# Patient Record
Sex: MALE | Race: WHITE | NOT HISPANIC OR LATINO | URBAN - METROPOLITAN AREA
[De-identification: names, ages, dates, MRNs, and addresses within clinical notes are randomized per-mention and may not be internally consistent; named-entity substitution may affect disease eponyms.]

---

## 2018-03-20 ENCOUNTER — INPATIENT (INPATIENT)
Facility: HOSPITAL | Age: 83
LOS: 8 days | Discharge: HOME | DRG: 180 | End: 2018-03-29
Attending: INTERNAL MEDICINE | Admitting: INTERNAL MEDICINE
Payer: MEDICARE

## 2018-03-20 VITALS
RESPIRATION RATE: 14 BRPM | HEIGHT: 67 IN | DIASTOLIC BLOOD PRESSURE: 52 MMHG | WEIGHT: 175.05 LBS | SYSTOLIC BLOOD PRESSURE: 104 MMHG | HEART RATE: 118 BPM | TEMPERATURE: 98 F

## 2018-03-20 DIAGNOSIS — J18.9 PNEUMONIA, UNSPECIFIED ORGANISM: ICD-10-CM

## 2018-03-20 LAB
ALBUMIN SERPL ELPH-MCNC: 2.2 G/DL — LOW (ref 3.5–5)
ALP SERPL-CCNC: 133 U/L — HIGH (ref 40–120)
ALT FLD-CCNC: 33 U/L DA — SIGNIFICANT CHANGE UP (ref 10–60)
ANION GAP SERPL CALC-SCNC: 12 MMOL/L — SIGNIFICANT CHANGE UP (ref 5–17)
AST SERPL-CCNC: 98 U/L — HIGH (ref 10–40)
BASE EXCESS BLDV CALC-SCNC: 4.8 MMOL/L — HIGH (ref -2–2)
BASOPHILS # BLD AUTO: 0.1 K/UL — SIGNIFICANT CHANGE UP (ref 0–0.2)
BASOPHILS NFR BLD AUTO: 0.5 % — SIGNIFICANT CHANGE UP (ref 0–2)
BILIRUB SERPL-MCNC: 0.9 MG/DL — SIGNIFICANT CHANGE UP (ref 0.2–1.2)
BLOOD GAS COMMENTS, VENOUS: SIGNIFICANT CHANGE UP
BUN SERPL-MCNC: 30 MG/DL — HIGH (ref 7–18)
CALCIUM SERPL-MCNC: 12.4 MG/DL — HIGH (ref 8.4–10.5)
CHLORIDE SERPL-SCNC: 104 MMOL/L — SIGNIFICANT CHANGE UP (ref 96–108)
CK SERPL-CCNC: 1476 U/L — HIGH (ref 35–232)
CO2 SERPL-SCNC: 24 MMOL/L — SIGNIFICANT CHANGE UP (ref 22–31)
CREAT SERPL-MCNC: 1.4 MG/DL — HIGH (ref 0.5–1.3)
EOSINOPHIL # BLD AUTO: 0 K/UL — SIGNIFICANT CHANGE UP (ref 0–0.5)
EOSINOPHIL NFR BLD AUTO: 0 % — SIGNIFICANT CHANGE UP (ref 0–6)
GLUCOSE SERPL-MCNC: 135 MG/DL — HIGH (ref 70–99)
HCO3 BLDV-SCNC: 28 MMOL/L — SIGNIFICANT CHANGE UP (ref 21–29)
HCT VFR BLD CALC: 33.1 % — LOW (ref 39–50)
HGB BLD-MCNC: 9.8 G/DL — LOW (ref 13–17)
HOROWITZ INDEX BLDV+IHG-RTO: 21 — SIGNIFICANT CHANGE UP
LACTATE SERPL-SCNC: 3.4 MMOL/L — HIGH (ref 0.7–2)
LYMPHOCYTES # BLD AUTO: 0.5 K/UL — LOW (ref 1–3.3)
LYMPHOCYTES # BLD AUTO: 3.2 % — LOW (ref 13–44)
MCHC RBC-ENTMCNC: 26.3 PG — LOW (ref 27–34)
MCHC RBC-ENTMCNC: 29.6 GM/DL — LOW (ref 32–36)
MCV RBC AUTO: 88.9 FL — SIGNIFICANT CHANGE UP (ref 80–100)
MONOCYTES # BLD AUTO: 0.7 K/UL — SIGNIFICANT CHANGE UP (ref 0–0.9)
MONOCYTES NFR BLD AUTO: 4.4 % — SIGNIFICANT CHANGE UP (ref 2–14)
NEUTROPHILS # BLD AUTO: 15 K/UL — HIGH (ref 1.8–7.4)
NEUTROPHILS NFR BLD AUTO: 91.9 % — HIGH (ref 43–77)
NT-PROBNP SERPL-SCNC: HIGH PG/ML (ref 0–450)
PCO2 BLDV: 40 MMHG — SIGNIFICANT CHANGE UP (ref 35–50)
PH BLDV: 7.47 — HIGH (ref 7.35–7.45)
PLATELET # BLD AUTO: 201 K/UL — SIGNIFICANT CHANGE UP (ref 150–400)
PO2 BLDV: <47 MMHG — SIGNIFICANT CHANGE UP (ref 25–45)
POTASSIUM SERPL-MCNC: 4.3 MMOL/L — SIGNIFICANT CHANGE UP (ref 3.5–5.3)
POTASSIUM SERPL-SCNC: 4.3 MMOL/L — SIGNIFICANT CHANGE UP (ref 3.5–5.3)
PROT SERPL-MCNC: 7.8 G/DL — SIGNIFICANT CHANGE UP (ref 6–8.3)
RBC # BLD: 3.72 M/UL — LOW (ref 4.2–5.8)
RBC # FLD: 15.8 % — HIGH (ref 10.3–14.5)
SAO2 % BLDV: 64 % — LOW (ref 67–88)
SODIUM SERPL-SCNC: 140 MMOL/L — SIGNIFICANT CHANGE UP (ref 135–145)
TROPONIN I SERPL-MCNC: 0.1 NG/ML — HIGH (ref 0–0.04)
WBC # BLD: 16.3 K/UL — HIGH (ref 3.8–10.5)
WBC # FLD AUTO: 16.3 K/UL — HIGH (ref 3.8–10.5)

## 2018-03-20 PROCEDURE — 71046 X-RAY EXAM CHEST 2 VIEWS: CPT | Mod: 26

## 2018-03-20 PROCEDURE — 72170 X-RAY EXAM OF PELVIS: CPT | Mod: 26

## 2018-03-20 PROCEDURE — 99285 EMERGENCY DEPT VISIT HI MDM: CPT

## 2018-03-20 RX ORDER — SODIUM CHLORIDE 9 MG/ML
1000 INJECTION INTRAMUSCULAR; INTRAVENOUS; SUBCUTANEOUS ONCE
Qty: 0 | Refills: 0 | Status: COMPLETED | OUTPATIENT
Start: 2018-03-20 | End: 2018-03-20

## 2018-03-20 RX ORDER — CEFTRIAXONE 500 MG/1
1 INJECTION, POWDER, FOR SOLUTION INTRAMUSCULAR; INTRAVENOUS ONCE
Qty: 0 | Refills: 0 | Status: COMPLETED | OUTPATIENT
Start: 2018-03-20 | End: 2018-03-20

## 2018-03-20 RX ORDER — AZITHROMYCIN 500 MG/1
500 TABLET, FILM COATED ORAL ONCE
Qty: 0 | Refills: 0 | Status: COMPLETED | OUTPATIENT
Start: 2018-03-20 | End: 2018-03-20

## 2018-03-20 RX ADMIN — SODIUM CHLORIDE 4000 MILLILITER(S): 9 INJECTION INTRAMUSCULAR; INTRAVENOUS; SUBCUTANEOUS at 22:40

## 2018-03-20 RX ADMIN — SODIUM CHLORIDE 4000 MILLILITER(S): 9 INJECTION INTRAMUSCULAR; INTRAVENOUS; SUBCUTANEOUS at 22:25

## 2018-03-20 RX ADMIN — AZITHROMYCIN 250 MILLIGRAM(S): 500 TABLET, FILM COATED ORAL at 22:40

## 2018-03-20 RX ADMIN — CEFTRIAXONE 100 GRAM(S): 500 INJECTION, POWDER, FOR SOLUTION INTRAMUSCULAR; INTRAVENOUS at 22:39

## 2018-03-20 NOTE — ED PROVIDER NOTE - MEDICAL DECISION MAKING DETAILS
admit for hydration, iv abx, and futher mgmt  rvp pending. pan cultured. ecg shows tachycardia to 116, no stemi

## 2018-03-20 NOTE — ED PROVIDER NOTE - CONDUCTED A DETAILED DISCUSSION WITH PATIENT AND/OR GUARDIAN REGARDING, MDM
lab results/need for outpatient follow-up/radiology results need to admit/lab results/radiology results

## 2018-03-20 NOTE — ED PROVIDER NOTE - PROGRESS NOTE DETAILS
xr suspicious for pna. treated with cftx/azithro  iv hydration. trop of 0.1, likely demand ischemia. dehydrated, cpk in 1400, but bnp 19k, judicious hydration.

## 2018-03-20 NOTE — ED ADULT TRIAGE NOTE - CHIEF COMPLAINT QUOTE
BIBA with c/o syncopal episode, as per EMS patient found on the floor . Patient lives alone.  [facility] # [phone] ext. [phone], and [facility] # [phone]

## 2018-03-20 NOTE — ED PROVIDER NOTE - OBJECTIVE STATEMENT
86 y/o M pt with PMHx of Anemia (on Iron) and no significant PSHx BIBA to ED with syncopal episode today. EMS reports pt was found on the floor of his appointment. In ED, pt states he slipped and fell on his mattress yesterday (last night), and was unable to get up until he was assisted by "policemen and a nurse". Pt notes recent dyspnea on exertion and abdominal pain as well. Pt denies any other complaints. NKDA. 88 y/o M pt with PMHx of Anemia (on Iron) and no significant PSHx BIBA to ED with syncopal episode today. EMS reports pt was found on the floor of his appointment. In ED, pt states he slipped and fell on his mattress yesterday (last night), and was unable to get up until he was assisted by "policemen and a nurse". Pt notes recent dyspnea on exertion and abdominal pain as well. Pt denies any other complaints. NKDA.  HCP niece who lives in [phone], [phone]

## 2018-03-20 NOTE — ED PROVIDER NOTE - CARE PLAN
Principal Discharge DX:	Pneumonia  Secondary Diagnosis:	Dehydration  Secondary Diagnosis:	Fall, initial encounter
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
## 2018-03-21 DIAGNOSIS — D72.829 ELEVATED WHITE BLOOD CELL COUNT, UNSPECIFIED: ICD-10-CM

## 2018-03-21 DIAGNOSIS — Z29.9 ENCOUNTER FOR PROPHYLACTIC MEASURES, UNSPECIFIED: ICD-10-CM

## 2018-03-21 DIAGNOSIS — D64.9 ANEMIA, UNSPECIFIED: ICD-10-CM

## 2018-03-21 DIAGNOSIS — N17.9 ACUTE KIDNEY FAILURE, UNSPECIFIED: ICD-10-CM

## 2018-03-21 DIAGNOSIS — M62.82 RHABDOMYOLYSIS: ICD-10-CM

## 2018-03-21 DIAGNOSIS — E83.52 HYPERCALCEMIA: ICD-10-CM

## 2018-03-21 LAB
24R-OH-CALCIDIOL SERPL-MCNC: 33 NG/ML — SIGNIFICANT CHANGE UP (ref 30–80)
ALBUMIN SERPL ELPH-MCNC: 1.9 G/DL — LOW (ref 3.5–5)
ALBUMIN SERPL ELPH-MCNC: 2 G/DL — LOW (ref 3.5–5)
ALP SERPL-CCNC: 114 U/L — SIGNIFICANT CHANGE UP (ref 40–120)
ALP SERPL-CCNC: 119 U/L — SIGNIFICANT CHANGE UP (ref 40–120)
ALT FLD-CCNC: 28 U/L DA — SIGNIFICANT CHANGE UP (ref 10–60)
ALT FLD-CCNC: 29 U/L DA — SIGNIFICANT CHANGE UP (ref 10–60)
ANION GAP SERPL CALC-SCNC: 9 MMOL/L — SIGNIFICANT CHANGE UP (ref 5–17)
ANION GAP SERPL CALC-SCNC: 9 MMOL/L — SIGNIFICANT CHANGE UP (ref 5–17)
APPEARANCE UR: CLEAR — SIGNIFICANT CHANGE UP
AST SERPL-CCNC: 68 U/L — HIGH (ref 10–40)
AST SERPL-CCNC: 78 U/L — HIGH (ref 10–40)
BASOPHILS # BLD AUTO: 0 K/UL — SIGNIFICANT CHANGE UP (ref 0–0.2)
BASOPHILS NFR BLD AUTO: 0.3 % — SIGNIFICANT CHANGE UP (ref 0–2)
BILIRUB SERPL-MCNC: 0.5 MG/DL — SIGNIFICANT CHANGE UP (ref 0.2–1.2)
BILIRUB SERPL-MCNC: 0.5 MG/DL — SIGNIFICANT CHANGE UP (ref 0.2–1.2)
BILIRUB UR-MCNC: NEGATIVE — SIGNIFICANT CHANGE UP
BUN SERPL-MCNC: 31 MG/DL — HIGH (ref 7–18)
BUN SERPL-MCNC: 33 MG/DL — HIGH (ref 7–18)
CALCIUM SERPL-MCNC: 11.6 MG/DL — HIGH (ref 8.4–10.5)
CALCIUM SERPL-MCNC: 11.8 MG/DL — HIGH (ref 8.4–10.5)
CALCIUM SERPL-MCNC: 12.6 MG/DL — HIGH (ref 8.4–10.5)
CEA SERPL-MCNC: 4.2 NG/ML — HIGH (ref 0–3.8)
CHLORIDE SERPL-SCNC: 107 MMOL/L — SIGNIFICANT CHANGE UP (ref 96–108)
CHLORIDE SERPL-SCNC: 110 MMOL/L — HIGH (ref 96–108)
CK MB BLD-MCNC: 1 % — SIGNIFICANT CHANGE UP (ref 0–3.5)
CK MB CFR SERPL CALC: 9.2 NG/ML — HIGH (ref 0–3.6)
CK SERPL-CCNC: 471 U/L — HIGH (ref 35–232)
CK SERPL-CCNC: 888 U/L — HIGH (ref 35–232)
CO2 SERPL-SCNC: 25 MMOL/L — SIGNIFICANT CHANGE UP (ref 22–31)
CO2 SERPL-SCNC: 27 MMOL/L — SIGNIFICANT CHANGE UP (ref 22–31)
COLOR SPEC: YELLOW — SIGNIFICANT CHANGE UP
CREAT SERPL-MCNC: 1 MG/DL — SIGNIFICANT CHANGE UP (ref 0.5–1.3)
CREAT SERPL-MCNC: 1.1 MG/DL — SIGNIFICANT CHANGE UP (ref 0.5–1.3)
DIFF PNL FLD: NEGATIVE — SIGNIFICANT CHANGE UP
EOSINOPHIL # BLD AUTO: 0 K/UL — SIGNIFICANT CHANGE UP (ref 0–0.5)
EOSINOPHIL NFR BLD AUTO: 0 % — SIGNIFICANT CHANGE UP (ref 0–6)
ERYTHROCYTE [SEDIMENTATION RATE] IN BLOOD: 112 MM/HR — HIGH (ref 0–20)
FERRITIN SERPL-MCNC: 1016 NG/ML — HIGH (ref 30–400)
FOLATE SERPL-MCNC: 14.9 NG/ML — SIGNIFICANT CHANGE UP (ref 4.8–24.2)
GLUCOSE SERPL-MCNC: 103 MG/DL — HIGH (ref 70–99)
GLUCOSE SERPL-MCNC: 119 MG/DL — HIGH (ref 70–99)
GLUCOSE UR QL: NEGATIVE — SIGNIFICANT CHANGE UP
HCT VFR BLD CALC: 29.1 % — LOW (ref 39–50)
HGB BLD-MCNC: 8.7 G/DL — LOW (ref 13–17)
KETONES UR-MCNC: NEGATIVE — SIGNIFICANT CHANGE UP
LACTATE SERPL-SCNC: 1.9 MMOL/L — SIGNIFICANT CHANGE UP (ref 0.7–2)
LACTATE SERPL-SCNC: 2.8 MMOL/L — HIGH (ref 0.7–2)
LDH SERPL L TO P-CCNC: 373 U/L — HIGH (ref 120–225)
LEUKOCYTE ESTERASE UR-ACNC: NEGATIVE — SIGNIFICANT CHANGE UP
LYMPHOCYTES # BLD AUTO: 0.6 K/UL — LOW (ref 1–3.3)
LYMPHOCYTES # BLD AUTO: 4.4 % — LOW (ref 13–44)
MCHC RBC-ENTMCNC: 26.6 PG — LOW (ref 27–34)
MCHC RBC-ENTMCNC: 29.8 GM/DL — LOW (ref 32–36)
MCV RBC AUTO: 89.4 FL — SIGNIFICANT CHANGE UP (ref 80–100)
MONOCYTES # BLD AUTO: 0.5 K/UL — SIGNIFICANT CHANGE UP (ref 0–0.9)
MONOCYTES NFR BLD AUTO: 4.1 % — SIGNIFICANT CHANGE UP (ref 2–14)
NEUTROPHILS # BLD AUTO: 11.8 K/UL — HIGH (ref 1.8–7.4)
NEUTROPHILS NFR BLD AUTO: 91.2 % — HIGH (ref 43–77)
NITRITE UR-MCNC: NEGATIVE — SIGNIFICANT CHANGE UP
PH UR: 5 — SIGNIFICANT CHANGE UP (ref 5–8)
PLATELET # BLD AUTO: 176 K/UL — SIGNIFICANT CHANGE UP (ref 150–400)
POTASSIUM SERPL-MCNC: 3.4 MMOL/L — LOW (ref 3.5–5.3)
POTASSIUM SERPL-MCNC: 4.1 MMOL/L — SIGNIFICANT CHANGE UP (ref 3.5–5.3)
POTASSIUM SERPL-SCNC: 3.4 MMOL/L — LOW (ref 3.5–5.3)
POTASSIUM SERPL-SCNC: 4.1 MMOL/L — SIGNIFICANT CHANGE UP (ref 3.5–5.3)
PROCALCITONIN SERPL-MCNC: 1.07 NG/ML — HIGH (ref 0–0.04)
PROT SERPL-MCNC: 6.7 G/DL — SIGNIFICANT CHANGE UP (ref 6–8.3)
PROT SERPL-MCNC: 7.3 G/DL — SIGNIFICANT CHANGE UP (ref 6–8.3)
PROT UR-MCNC: 30 MG/DL
PTH-INTACT FLD-MCNC: 10 PG/ML — LOW (ref 15–65)
RAPID RVP RESULT: SIGNIFICANT CHANGE UP
RBC # BLD: 3.07 M/UL — LOW (ref 4.2–5.8)
RBC # BLD: 3.26 M/UL — LOW (ref 4.2–5.8)
RBC # FLD: 15.8 % — HIGH (ref 10.3–14.5)
RETICS #: 61.7 K/UL — SIGNIFICANT CHANGE UP (ref 25–125)
RETICS/RBC NFR: 2 % — SIGNIFICANT CHANGE UP (ref 0.5–2.5)
SODIUM SERPL-SCNC: 143 MMOL/L — SIGNIFICANT CHANGE UP (ref 135–145)
SODIUM SERPL-SCNC: 144 MMOL/L — SIGNIFICANT CHANGE UP (ref 135–145)
SP GR SPEC: 1.02 — SIGNIFICANT CHANGE UP (ref 1.01–1.02)
TROPONIN I SERPL-MCNC: 0.06 NG/ML — HIGH (ref 0–0.04)
UROBILINOGEN FLD QL: NEGATIVE — SIGNIFICANT CHANGE UP
VIT B12 SERPL-MCNC: 333 PG/ML — SIGNIFICANT CHANGE UP (ref 232–1245)
WBC # BLD: 12.9 K/UL — HIGH (ref 3.8–10.5)
WBC # FLD AUTO: 12.9 K/UL — HIGH (ref 3.8–10.5)

## 2018-03-21 PROCEDURE — 93880 EXTRACRANIAL BILAT STUDY: CPT | Mod: 26

## 2018-03-21 PROCEDURE — 70450 CT HEAD/BRAIN W/O DYE: CPT | Mod: 26

## 2018-03-21 RX ORDER — METOPROLOL TARTRATE 50 MG
25 TABLET ORAL
Qty: 0 | Refills: 0 | Status: DISCONTINUED | OUTPATIENT
Start: 2018-03-21 | End: 2018-03-29

## 2018-03-21 RX ORDER — ALLOPURINOL 300 MG
300 TABLET ORAL DAILY
Qty: 0 | Refills: 0 | Status: DISCONTINUED | OUTPATIENT
Start: 2018-03-21 | End: 2018-03-28

## 2018-03-21 RX ORDER — SODIUM CHLORIDE 9 MG/ML
1000 INJECTION INTRAMUSCULAR; INTRAVENOUS; SUBCUTANEOUS
Qty: 0 | Refills: 0 | Status: DISCONTINUED | OUTPATIENT
Start: 2018-03-21 | End: 2018-03-23

## 2018-03-21 RX ORDER — HEPARIN SODIUM 5000 [USP'U]/ML
5000 INJECTION INTRAVENOUS; SUBCUTANEOUS EVERY 8 HOURS
Qty: 0 | Refills: 0 | Status: DISCONTINUED | OUTPATIENT
Start: 2018-03-21 | End: 2018-03-28

## 2018-03-21 RX ORDER — METOPROLOL TARTRATE 50 MG
5 TABLET ORAL ONCE
Qty: 0 | Refills: 0 | Status: COMPLETED | OUTPATIENT
Start: 2018-03-21 | End: 2018-03-21

## 2018-03-21 RX ORDER — AMLODIPINE BESYLATE 2.5 MG/1
5 TABLET ORAL DAILY
Qty: 0 | Refills: 0 | Status: DISCONTINUED | OUTPATIENT
Start: 2018-03-21 | End: 2018-03-28

## 2018-03-21 RX ORDER — POTASSIUM CHLORIDE 20 MEQ
40 PACKET (EA) ORAL ONCE
Qty: 0 | Refills: 0 | Status: COMPLETED | OUTPATIENT
Start: 2018-03-21 | End: 2018-03-22

## 2018-03-21 RX ORDER — FERROUS SULFATE 325(65) MG
325 TABLET ORAL DAILY
Qty: 0 | Refills: 0 | Status: DISCONTINUED | OUTPATIENT
Start: 2018-03-21 | End: 2018-03-27

## 2018-03-21 RX ADMIN — Medication 5 MILLIGRAM(S): at 18:58

## 2018-03-21 RX ADMIN — Medication 325 MILLIGRAM(S): at 12:08

## 2018-03-21 RX ADMIN — SODIUM CHLORIDE 80 MILLILITER(S): 9 INJECTION INTRAMUSCULAR; INTRAVENOUS; SUBCUTANEOUS at 22:18

## 2018-03-21 RX ADMIN — Medication 300 MILLIGRAM(S): at 12:08

## 2018-03-21 RX ADMIN — AMLODIPINE BESYLATE 5 MILLIGRAM(S): 2.5 TABLET ORAL at 06:30

## 2018-03-21 RX ADMIN — HEPARIN SODIUM 5000 UNIT(S): 5000 INJECTION INTRAVENOUS; SUBCUTANEOUS at 22:17

## 2018-03-21 RX ADMIN — HEPARIN SODIUM 5000 UNIT(S): 5000 INJECTION INTRAVENOUS; SUBCUTANEOUS at 17:14

## 2018-03-21 RX ADMIN — Medication 1 TABLET(S): at 12:08

## 2018-03-21 RX ADMIN — Medication 25 MILLIGRAM(S): at 22:17

## 2018-03-21 NOTE — ED ADULT NURSE NOTE - CHIEF COMPLAINT QUOTE
BIBA with c/o syncopal episode, as per EMS patient found on the floor . Patient lives alone.  [facility] # [phone] ext. [phone], and [facility] # [phone]

## 2018-03-21 NOTE — H&P ADULT - PROBLEM SELECTOR PLAN 7
IMPROVE VTE Individual Risk Assessment          RISK                                                          Points   [ x ] Immobilization > 24 hrs                              1  [ x ] Age > 60                                                         1    IMPROVE VTE Score: 2    c/w lovenox for VTE prophylaxis c/w iron supplementation c/w iron supplementation  may need further evaluation for multiple myeloma

## 2018-03-21 NOTE — ED ADULT NURSE REASSESSMENT NOTE - NS ED NURSE REASSESS COMMENT FT1
Received pt from AILEEN Fernandez, pt is observed laying in bed, breathing room air, in no respiratory distress. Pt is A&O x3, skin is intact, bruises to right elbow noted. On tele box D, normal sinus rhythm noted. AM Meds administered as ordered nursing monitoring continues.
Patient start on oral gastro view not tolerating well refusing to drink any more drank only amy Greco Np made aware.

## 2018-03-21 NOTE — H&P ADULT - ASSESSMENT
88 y/o M pt with PMHx of Anemia (on Iron), HTN, gout and no significant PSHx BIBA to ED with syncopal episode today. EMS reports pt was found on the floor of his apartment. In ED, pt states he slipped and fell on his mattress on the night of 3/19, and was unable to get up until he was assisted by "policemen and a nurse" on 3/20. Pt notes recent dyspnea on exertion, and generalized pain everywhere after the fall. Pt denies LOC, head trauma.     Pt admitted for evaluation of possible syncope given unwitnessed fall along with rhabdomyolysis.

## 2018-03-21 NOTE — H&P ADULT - PROBLEM SELECTOR PLAN 4
improved on admission 2/2 dehydration  given IV fluids in ED  continue to monitor BMP for creatinine CK improved from 14k to 800  c/w IV fluid hydration CK improved from 14k to 800  c/w IV fluid hydration  elevated lactate, trending down from 3.2 to 2.8  f/u lactate at 12PM

## 2018-03-21 NOTE — CONSULT NOTE ADULT - SUBJECTIVE AND OBJECTIVE BOX
87 year old male with h/o anemia for a few years, DM, HTN slipped and fell at home and not able to get up.  no LOC, fever, chills, sob, CP, headache.  he does have SOBE and pain everywhere after fall.    alert, not in distress. no palpable nodes at neck. chest is clear. abd is soft and flat. no mass. no leg edema.                        9.8    16.3  )-----------( 201      ( 20 Mar 2018 21:17 )             33.1   03-21    144  |  110<H>  |  33<H>  ----------------------------<  119<H>  4.1   |  25  |  1.10    Ca    11.8<H>      21 Mar 2018 04:20    TPro  6.7  /  Alb  1.9<L>< from: Xray Chest 2 Views PA/Lat (03.20.18 @ 20:48) >  INTERPRETATION:  PA and lateral chest on March 20, 2018 at 8:17 PM.   Patient had a syncopal episode and has abnormal breath sounds.    Heart size is within normal limits. A large hiatal hernia is seen on the   lateral view.    On June 21, 2012 the lungs were under circulated but otherwise   unremarkable.    Present film shows small scattered amorphous densities in the lungs left   greater than right and aleft base atelectatic type process.    A CAT scan of the chest is probably appropriate to fully assess the lung   fields.    IMPRESSION: Scattered amorphous lung densities and a sizable hiatal   hernia. Consider CT chest for further assessment.    < end of copied text >  < from: Xray Pelvis AP only (03.20.18 @ 22:40) >  INTERPRETATION:  Pelvis. Patient had a fall and has local pain.    The hips are relatively free of degeneration and are symmetric.    There is lower lumbar spondylitic change.    No bone destruction or fracture is evident.    There are arterial calcifications.    The visualized colon is mildly distended with gas and fecal content but   does not overtly suggest obstruction.    IMPRESSION: As above.      < end of copied text >    /  TBili  0.5  /  DBili  x   /  AST  78<H>  /  ALT  29  /  AlkPhos  114  03-21

## 2018-03-21 NOTE — CHART NOTE - NSCHARTNOTEFT_GEN_A_CORE
Paged by RN for tachycardia - on telemetry Afib w/ RVR 130s, no known Hx as per HPI  - Giving 5 IV Lopressor + PO 25 BID Paged by RN for tachycardia - on telemetry concerning for Afib w/ RVR 130s, no known Hx as per HPI, patient in NAD. No CP, SOB, lightheadedness, or abdominal complaints  - EKG shows definite P waves; irregular rhythm; possible atrial tachycardia w/ rate dependent block  - No Hx of Lung disease  - Giving 5 IV Lopressor; resolved to NSR  - C/w PO Lopressor BID  Primary team to follow up

## 2018-03-21 NOTE — ED ADULT NURSE NOTE - OBJECTIVE STATEMENT
brought in by EMS due to syncopal episode found on floor. Pt Alert verbally responsive, oriented to name and place. Seen and examined by Dr Licea. brought in by EMS due to syncopal episode found on floor. Seen and examined by Dr Licea. brought in by EMS due to syncopal episode found on floor. Seen and examined by Dr Licea. Pt states he slipped and fell noted with abrasion to right elbow with minimal redness and swelling.

## 2018-03-21 NOTE — H&P ADULT - PROBLEM SELECTOR PLAN 5
PT consult improved on admission 2/2 dehydration  given IV fluids in ED  continue to monitor BMP for creatinine improved on admission 2/2 dehydration  given IV fluids in ED  continue to monitor BMP for creatinine at 12PM

## 2018-03-21 NOTE — H&P ADULT - PROBLEM SELECTOR PLAN 2
f/u CXR reading  CT head shows possible sinusitis  s/p 1 dose of ceftriaxone, azithromycin in ED  c/w amoxicillin/clavulanate f/u CXR reading, possible CT chest  CT head shows possible sinusitis  s/p 1 dose of ceftriaxone, azithromycin in ED  c/w amoxicillin/clavulanate f/u CXR reading, CT chest  CT head shows possible sinusitis  s/p 1 dose of ceftriaxone, azithromycin in ED  c/w amoxicillin/clavulanate for concerns of sinusitis for now, can change abx depending on CT findings  f/u CBC at 12PM, procalcitonin, urine culture

## 2018-03-21 NOTE — H&P ADULT - PROBLEM SELECTOR PLAN 1
continue with telemetry monitoring for 24 hrs  f/u echocardiogram  f/u carotid dopplers  troponin x2 negative

## 2018-03-21 NOTE — H&P ADULT - PROBLEM SELECTOR PLAN 3
CK improved from 14k to 800  c/w IV fluid hydration elevated calcium in setting of NINFA  continue with IVF  f/u PTH, Vit D, elevated calcium in setting of NINFA, anemia, may  need evaluation for possible multiple myeloma  continue with IVF  f/u PTH, Vit D at 12PM  f/u heme/onc - Dr. Bailey elevated calcium in setting of NINFA, anemia, may  need evaluation for possible multiple myeloma  continue with IVF  f/u PTH, Vit D at 12PM  f/u heme/onc - Dr. Bailey  f/u CT Abd/pelvis

## 2018-03-21 NOTE — H&P ADULT - HISTORY OF PRESENT ILLNESS
86 y/o M pt with PMHx of Anemia (on Iron) and no significant PSHx BIBA to ED with syncopal episode today. EMS reports pt was found on the floor of his apartmentt. In ED, pt states he slipped and fell on his mattress yesterday (last night), and was unable to get up until he was assisted by "policemen and a nurse". Pt notes recent dyspnea on exertion and abdominal pain as well. Pt denies any other complaints    HCP niece who lives in [phone], [phone] 88 y/o M pt with PMHx of Anemia (on Iron), HTN, gout and no significant PSHx BIBA to ED with syncopal episode today. EMS reports pt was found on the floor of his apartment. In ED, pt states he slipped and fell on his mattress on the night of 3/19, and was unable to get up until he was assisted by "policemen and a nurse" on 3/20. Pt notes recent dyspnea on exertion, and generalized pain everywhere after the fall. Pt denies LOC, head trauma, fever, headache, shortness of breath, chest pain, abdominal pain, dysuria, diarrhea. all other ROS negative.

## 2018-03-21 NOTE — H&P ADULT - NSHPSOCIALHISTORY_GEN_ALL_CORE
pt lives alone; retired from [employer]; states smoking and drinking alcohol many years ago, however quit; denies current tobacco/alcohol/drug use

## 2018-03-21 NOTE — H&P ADULT - NSHPPHYSICALEXAM_GEN_ALL_CORE
Vital Signs Last 24 Hrs  T(C): 36.4 (21 Mar 2018 00:11), Max: 36.8 (20 Mar 2018 19:50)  T(F): 97.6 (21 Mar 2018 00:11), Max: 98.2 (20 Mar 2018 19:50)  HR: 104 (21 Mar 2018 00:11) (104 - 118)  BP: 117/68 (21 Mar 2018 00:11) (104/52 - 117/68)  RR: 16 (21 Mar 2018 00:11) (14 - 16)  SpO2: 94% (21 Mar 2018 00:11) (94% - 94%)

## 2018-03-21 NOTE — H&P ADULT - NSHPLABSRESULTS_GEN_ALL_CORE
LABS:      CBC Full  -  ( 20 Mar 2018 21:17 )  WBC Count : 16.3 K/uL  Hemoglobin : 9.8 g/dL  Hematocrit : 33.1 %  Platelet Count - Automated : 201 K/uL  Mean Cell Volume : 88.9 fl  Mean Cell Hemoglobin : 26.3 pg  Mean Cell Hemoglobin Concentration : 29.6 gm/dL  Auto Neutrophil # : 15.0 K/uL  Auto Lymphocyte # : 0.5 K/uL  Auto Monocyte # : 0.7 K/uL  Auto Eosinophil # : 0.0 K/uL  Auto Basophil # : 0.1 K/uL  Auto Neutrophil % : 91.9 %  Auto Lymphocyte % : 3.2 %  Auto Monocyte % : 4.4 %  Auto Eosinophil % : 0.0 %  Auto Basophil % : 0.5 %    03    144  |  110<H>  |  33<H>  ----------------------------<  119<H>  4.1   |  25  |  1.10    Ca    11.8<H>      21 Mar 2018 04:20    TPro  6.7  /  Alb  1.9<L>  /  TBili  0.5  /  DBili  x   /  AST  78<H>  /  ALT  29  /  AlkPhos  114  -          Urinalysis Basic - ( 21 Mar 2018 00:35 )    Color: Yellow / Appearance: Clear / S.025 / pH: x  Gluc: x / Ketone: Negative  / Bili: Negative / Urobili: Negative   Blood: x / Protein: 30 mg/dL / Nitrite: Negative   Leuk Esterase: Negative / RBC: 5-10 /HPF / WBC 6-10 /HPF   Sq Epi: x / Non Sq Epi: Few /HPF / Bacteria: Moderate /HPF                RADIOLOGY & ADDITIONAL STUDIES (The following images were personally reviewed):    EKG:     CXR LABS:      CBC Full  -  ( 20 Mar 2018 21:17 )  WBC Count : 16.3 K/uL  Hemoglobin : 9.8 g/dL  Hematocrit : 33.1 %  Platelet Count - Automated : 201 K/uL  Mean Cell Volume : 88.9 fl  Mean Cell Hemoglobin : 26.3 pg  Mean Cell Hemoglobin Concentration : 29.6 gm/dL  Auto Neutrophil # : 15.0 K/uL  Auto Lymphocyte # : 0.5 K/uL  Auto Monocyte # : 0.7 K/uL  Auto Eosinophil # : 0.0 K/uL  Auto Basophil # : 0.1 K/uL  Auto Neutrophil % : 91.9 %  Auto Lymphocyte % : 3.2 %  Auto Monocyte % : 4.4 %  Auto Eosinophil % : 0.0 %  Auto Basophil % : 0.5 %    03    144  |  110<H>  |  33<H>  ----------------------------<  119<H>  4.1   |  25  |  1.10    Ca    11.8<H>      21 Mar 2018 04:20    TPro  6.7  /  Alb  1.9<L>  /  TBili  0.5  /  DBili  x   /  AST  78<H>  /  ALT  29  /  AlkPhos  114  03-          Urinalysis Basic - ( 21 Mar 2018 00:35 )    Color: Yellow / Appearance: Clear / S.025 / pH: x  Gluc: x / Ketone: Negative  / Bili: Negative / Urobili: Negative   Blood: x / Protein: 30 mg/dL / Nitrite: Negative   Leuk Esterase: Negative / RBC: 5-10 /HPF / WBC 6-10 /HPF   Sq Epi: x / Non Sq Epi: Few /HPF / Bacteria: Moderate /HPF                RADIOLOGY & ADDITIONAL STUDIES (The following images were personally reviewed):    EKG:     CXR:

## 2018-03-21 NOTE — H&P ADULT - PROBLEM SELECTOR PLAN 8
IMPROVE VTE Individual Risk Assessment          RISK                                                          Points   [ x ] Immobilization > 24 hrs                              1  [ x ] Age > 60                                                         1    IMPROVE VTE Score: 2    c/w lovenox for VTE prophylaxis

## 2018-03-22 DIAGNOSIS — Z71.89 OTHER SPECIFIED COUNSELING: ICD-10-CM

## 2018-03-22 LAB
ANION GAP SERPL CALC-SCNC: 11 MMOL/L — SIGNIFICANT CHANGE UP (ref 5–17)
BUN SERPL-MCNC: 35 MG/DL — HIGH (ref 7–18)
CALCIUM SERPL-MCNC: 11.6 MG/DL — HIGH (ref 8.4–10.5)
CHLORIDE SERPL-SCNC: 111 MMOL/L — HIGH (ref 96–108)
CK SERPL-CCNC: 241 U/L — HIGH (ref 35–232)
CO2 SERPL-SCNC: 24 MMOL/L — SIGNIFICANT CHANGE UP (ref 22–31)
CREAT SERPL-MCNC: 0.9 MG/DL — SIGNIFICANT CHANGE UP (ref 0.5–1.3)
GLUCOSE BLDC GLUCOMTR-MCNC: 105 MG/DL — HIGH (ref 70–99)
GLUCOSE SERPL-MCNC: 97 MG/DL — SIGNIFICANT CHANGE UP (ref 70–99)
HAPTOGLOB SERPL-MCNC: 241 MG/DL — HIGH (ref 34–200)
HCT VFR BLD CALC: 27.9 % — LOW (ref 39–50)
HGB BLD-MCNC: 8.4 G/DL — LOW (ref 13–17)
INTERPRETATION SERPL IFE-IMP: SIGNIFICANT CHANGE UP
MCHC RBC-ENTMCNC: 26.9 PG — LOW (ref 27–34)
MCHC RBC-ENTMCNC: 30.2 GM/DL — LOW (ref 32–36)
MCV RBC AUTO: 89.1 FL — SIGNIFICANT CHANGE UP (ref 80–100)
PLATELET # BLD AUTO: 173 K/UL — SIGNIFICANT CHANGE UP (ref 150–400)
POTASSIUM SERPL-MCNC: 3.8 MMOL/L — SIGNIFICANT CHANGE UP (ref 3.5–5.3)
POTASSIUM SERPL-SCNC: 3.8 MMOL/L — SIGNIFICANT CHANGE UP (ref 3.5–5.3)
RBC # BLD: 3.14 M/UL — LOW (ref 4.2–5.8)
RBC # FLD: 15.9 % — HIGH (ref 10.3–14.5)
SODIUM SERPL-SCNC: 146 MMOL/L — HIGH (ref 135–145)
WBC # BLD: 13.9 K/UL — HIGH (ref 3.8–10.5)
WBC # FLD AUTO: 13.9 K/UL — HIGH (ref 3.8–10.5)

## 2018-03-22 PROCEDURE — 71260 CT THORAX DX C+: CPT | Mod: 26

## 2018-03-22 PROCEDURE — 74177 CT ABD & PELVIS W/CONTRAST: CPT | Mod: 26

## 2018-03-22 RX ORDER — LANOLIN ALCOHOL/MO/W.PET/CERES
3 CREAM (GRAM) TOPICAL AT BEDTIME
Qty: 0 | Refills: 0 | Status: DISCONTINUED | OUTPATIENT
Start: 2018-03-22 | End: 2018-03-28

## 2018-03-22 RX ORDER — MORPHINE SULFATE 50 MG/1
1 CAPSULE, EXTENDED RELEASE ORAL ONCE
Qty: 0 | Refills: 0 | Status: DISCONTINUED | OUTPATIENT
Start: 2018-03-22 | End: 2018-03-22

## 2018-03-22 RX ORDER — HALOPERIDOL DECANOATE 100 MG/ML
1 INJECTION INTRAMUSCULAR ONCE
Qty: 0 | Refills: 0 | Status: COMPLETED | OUTPATIENT
Start: 2018-03-22 | End: 2018-03-22

## 2018-03-22 RX ORDER — HALOPERIDOL DECANOATE 100 MG/ML
1 INJECTION INTRAMUSCULAR ONCE
Qty: 0 | Refills: 0 | Status: DISCONTINUED | OUTPATIENT
Start: 2018-03-22 | End: 2018-03-22

## 2018-03-22 RX ADMIN — HEPARIN SODIUM 5000 UNIT(S): 5000 INJECTION INTRAVENOUS; SUBCUTANEOUS at 13:55

## 2018-03-22 RX ADMIN — Medication 25 MILLIGRAM(S): at 17:40

## 2018-03-22 RX ADMIN — MORPHINE SULFATE 1 MILLIGRAM(S): 50 CAPSULE, EXTENDED RELEASE ORAL at 20:09

## 2018-03-22 RX ADMIN — Medication 40 MILLIEQUIVALENT(S): at 12:32

## 2018-03-22 RX ADMIN — MORPHINE SULFATE 1 MILLIGRAM(S): 50 CAPSULE, EXTENDED RELEASE ORAL at 19:54

## 2018-03-22 RX ADMIN — Medication 3 MILLIGRAM(S): at 00:18

## 2018-03-22 RX ADMIN — HEPARIN SODIUM 5000 UNIT(S): 5000 INJECTION INTRAVENOUS; SUBCUTANEOUS at 22:30

## 2018-03-22 RX ADMIN — Medication 3 MILLIGRAM(S): at 22:30

## 2018-03-22 RX ADMIN — Medication 1 TABLET(S): at 17:41

## 2018-03-22 RX ADMIN — Medication 25 MILLIGRAM(S): at 06:35

## 2018-03-22 RX ADMIN — Medication 325 MILLIGRAM(S): at 12:32

## 2018-03-22 RX ADMIN — HEPARIN SODIUM 5000 UNIT(S): 5000 INJECTION INTRAVENOUS; SUBCUTANEOUS at 06:35

## 2018-03-22 RX ADMIN — Medication 300 MILLIGRAM(S): at 12:32

## 2018-03-22 RX ADMIN — AMLODIPINE BESYLATE 5 MILLIGRAM(S): 2.5 TABLET ORAL at 06:35

## 2018-03-22 NOTE — PHYSICAL THERAPY INITIAL EVALUATION ADULT - LIVES WITH, PROFILE
Patient stated that he lives alone in an apt.Patient has HHA few hours for 3 days/week.Patient was not able to provide information on stairs if needed to negotiate at home/alone

## 2018-03-22 NOTE — PROGRESS NOTE ADULT - PROBLEM SELECTOR PLAN 2
CXR - Scattered amorphous lung densities and a sizable hiatal   hernia.  f/u CT chest/abd w/ IV cont r/o neoplastic processes  CT head shows possible sinusitis  c/w augmentin Day 2 - sinusitis   procalcitonin 1.07  f/u UCx

## 2018-03-22 NOTE — PROGRESS NOTE ADULT - SUBJECTIVE AND OBJECTIVE BOX
feel weak and pain all over  PTH low  for CT chest and abd  the densities in chest most likely from mets  myeloma w/u in progress.

## 2018-03-22 NOTE — PHYSICAL THERAPY INITIAL EVALUATION ADULT - PERTINENT HX OF CURRENT PROBLEM, REHAB EVAL
Patient was brought to ED from home secondary to s/p fall at home.Imaging of B hips and lower lumbar spine negative for fracture or dislocation.Brain Imaging negative for acute findings

## 2018-03-22 NOTE — PROGRESS NOTE ADULT - PROBLEM SELECTOR PLAN 8
IMPROVE VTE Individual Risk Assessment          RISK                                                          Points   [ x ] Immobilization > 24 hrs                              1  [ x ] Age > 60                                                         1    IMPROVE VTE Score: 2    c/w lovenox for VTE prophylaxis  GI ppx not indicated c/w iron supplementation

## 2018-03-22 NOTE — CHART NOTE - NSCHARTNOTEFT_GEN_A_CORE
Attempted to contact patient's  from nursing home ([facility] @ [phone] ext. [phone]), however, social work staff informed me that [facility] is not working today and will return to office tomorrow, 3/22.  Informed that someone from social work department will contact me later today to potentially provide me with patient's health-care proxy contact information so that I can obtain consent for patient's CT abdomen and chest with IV contrast. Attempted to contact patient's  from nursing home ([facility] @ [phone] ext. [phone]), however, social work staff informed me that [facility] is not working today and will return to office tomorrow, 3/22.  Informed that someone from social work department will contact me later today to potentially provide me with patient's health-care proxy contact information so that I can obtain consent for patient's CT abdomen and chest with IV contrast.  Primary team, please attempt to call [facility] tomorrow for consent for IV contrast.

## 2018-03-22 NOTE — PROGRESS NOTE ADULT - PROBLEM SELECTOR PLAN 3
heme/onc - Dr. Bailey  Ca 12.6  intact PTH 10  Vit D 33 Niece HCP Jennifer Princess in [place]   cell [phone]  home [phone]    PCP Dane Alvarenga  NP [facility]  [phone]/ [phone]  WBC sept wnl - will monitor for leukocytosis on this admission

## 2018-03-22 NOTE — ADVANCED PRACTICE NURSE CONSULT - RECOMMEDATIONS
-Clean all wounds with normal saline and apply skin prep to the surrounding skin  -Apply TRIAD Moisture Barrier Cream to the Gluteal Fold and Bilateral Gluteus b.i.d. PRN  -Apply a Foam dressing to the L. Hip wound Q 72hrs PRN  -Elevate/float the patients heels using heel protectors and reposition the patient Q 2hrs using wedges or pillows  -Frequent toileting

## 2018-03-23 DIAGNOSIS — R91.8 OTHER NONSPECIFIC ABNORMAL FINDING OF LUNG FIELD: ICD-10-CM

## 2018-03-23 DIAGNOSIS — Z51.5 ENCOUNTER FOR PALLIATIVE CARE: ICD-10-CM

## 2018-03-23 DIAGNOSIS — C79.51 SECONDARY MALIGNANT NEOPLASM OF BONE: ICD-10-CM

## 2018-03-23 DIAGNOSIS — S22.39XA FRACTURE OF ONE RIB, UNSPECIFIED SIDE, INITIAL ENCOUNTER FOR CLOSED FRACTURE: ICD-10-CM

## 2018-03-23 DIAGNOSIS — M54.9 DORSALGIA, UNSPECIFIED: ICD-10-CM

## 2018-03-23 DIAGNOSIS — R53.81 OTHER MALAISE: ICD-10-CM

## 2018-03-23 LAB
ANION GAP SERPL CALC-SCNC: 11 MMOL/L — SIGNIFICANT CHANGE UP (ref 5–17)
ANION GAP SERPL CALC-SCNC: 13 MMOL/L — SIGNIFICANT CHANGE UP (ref 5–17)
BASOPHILS # BLD AUTO: 0 K/UL — SIGNIFICANT CHANGE UP (ref 0–0.2)
BASOPHILS NFR BLD AUTO: 0.2 % — SIGNIFICANT CHANGE UP (ref 0–2)
BUN SERPL-MCNC: 42 MG/DL — HIGH (ref 7–18)
BUN SERPL-MCNC: 48 MG/DL — HIGH (ref 7–18)
CALCIUM SERPL-MCNC: 11.5 MG/DL — HIGH (ref 8.4–10.5)
CALCIUM SERPL-MCNC: 11.7 MG/DL — HIGH (ref 8.4–10.5)
CHLORIDE SERPL-SCNC: 113 MMOL/L — HIGH (ref 96–108)
CHLORIDE SERPL-SCNC: 115 MMOL/L — HIGH (ref 96–108)
CO2 SERPL-SCNC: 23 MMOL/L — SIGNIFICANT CHANGE UP (ref 22–31)
CO2 SERPL-SCNC: 25 MMOL/L — SIGNIFICANT CHANGE UP (ref 22–31)
CREAT SERPL-MCNC: 0.88 MG/DL — SIGNIFICANT CHANGE UP (ref 0.5–1.3)
CREAT SERPL-MCNC: 1.04 MG/DL — SIGNIFICANT CHANGE UP (ref 0.5–1.3)
EOSINOPHIL # BLD AUTO: 0 K/UL — SIGNIFICANT CHANGE UP (ref 0–0.5)
EOSINOPHIL NFR BLD AUTO: 0 % — SIGNIFICANT CHANGE UP (ref 0–6)
GLUCOSE BLDC GLUCOMTR-MCNC: 111 MG/DL — HIGH (ref 70–99)
GLUCOSE BLDC GLUCOMTR-MCNC: 117 MG/DL — HIGH (ref 70–99)
GLUCOSE BLDC GLUCOMTR-MCNC: 163 MG/DL — HIGH (ref 70–99)
GLUCOSE SERPL-MCNC: 114 MG/DL — HIGH (ref 70–99)
GLUCOSE SERPL-MCNC: 98 MG/DL — SIGNIFICANT CHANGE UP (ref 70–99)
HCT VFR BLD CALC: 25.8 % — LOW (ref 39–50)
HGB BLD-MCNC: 8 G/DL — LOW (ref 13–17)
KAPPA LC SER QL IFE: 10.1 MG/DL — HIGH (ref 0.33–1.94)
KAPPA/LAMBDA FREE LIGHT CHAIN RATIO, SERUM: 1.74 RATIO — HIGH (ref 0.26–1.65)
LAMBDA LC SER QL IFE: 5.79 MG/DL — HIGH (ref 0.57–2.63)
LYMPHOCYTES # BLD AUTO: 0.4 K/UL — LOW (ref 1–3.3)
LYMPHOCYTES # BLD AUTO: 3.5 % — LOW (ref 13–44)
MAGNESIUM SERPL-MCNC: 2.2 MG/DL — SIGNIFICANT CHANGE UP (ref 1.6–2.6)
MCHC RBC-ENTMCNC: 28.2 PG — SIGNIFICANT CHANGE UP (ref 27–34)
MCHC RBC-ENTMCNC: 31.1 GM/DL — LOW (ref 32–36)
MCV RBC AUTO: 90.6 FL — SIGNIFICANT CHANGE UP (ref 80–100)
MONOCYTES # BLD AUTO: 0.5 K/UL — SIGNIFICANT CHANGE UP (ref 0–0.9)
MONOCYTES NFR BLD AUTO: 4.7 % — SIGNIFICANT CHANGE UP (ref 2–14)
NEUTROPHILS # BLD AUTO: 10.4 K/UL — HIGH (ref 1.8–7.4)
NEUTROPHILS NFR BLD AUTO: 91.6 % — HIGH (ref 43–77)
PHOSPHATE SERPL-MCNC: 2.6 MG/DL — SIGNIFICANT CHANGE UP (ref 2.5–4.5)
PLATELET # BLD AUTO: 163 K/UL — SIGNIFICANT CHANGE UP (ref 150–400)
POTASSIUM SERPL-MCNC: 3 MMOL/L — LOW (ref 3.5–5.3)
POTASSIUM SERPL-MCNC: 3.3 MMOL/L — LOW (ref 3.5–5.3)
POTASSIUM SERPL-SCNC: 3 MMOL/L — LOW (ref 3.5–5.3)
POTASSIUM SERPL-SCNC: 3.3 MMOL/L — LOW (ref 3.5–5.3)
RBC # BLD: 2.85 M/UL — LOW (ref 4.2–5.8)
RBC # FLD: 16.1 % — HIGH (ref 10.3–14.5)
SODIUM SERPL-SCNC: 149 MMOL/L — HIGH (ref 135–145)
SODIUM SERPL-SCNC: 151 MMOL/L — HIGH (ref 135–145)
WBC # BLD: 11.3 K/UL — HIGH (ref 3.8–10.5)
WBC # FLD AUTO: 11.3 K/UL — HIGH (ref 3.8–10.5)

## 2018-03-23 PROCEDURE — 71045 X-RAY EXAM CHEST 1 VIEW: CPT | Mod: 26

## 2018-03-23 PROCEDURE — 71045 X-RAY EXAM CHEST 1 VIEW: CPT | Mod: 26,77

## 2018-03-23 PROCEDURE — 99223 1ST HOSP IP/OBS HIGH 75: CPT

## 2018-03-23 PROCEDURE — 76700 US EXAM ABDOM COMPLETE: CPT | Mod: 26

## 2018-03-23 RX ORDER — INSULIN LISPRO 100/ML
VIAL (ML) SUBCUTANEOUS
Qty: 0 | Refills: 0 | Status: DISCONTINUED | OUTPATIENT
Start: 2018-03-23 | End: 2018-03-26

## 2018-03-23 RX ORDER — TRAMADOL HYDROCHLORIDE 50 MG/1
25 TABLET ORAL EVERY 6 HOURS
Qty: 0 | Refills: 0 | Status: DISCONTINUED | OUTPATIENT
Start: 2018-03-23 | End: 2018-03-28

## 2018-03-23 RX ORDER — OXYCODONE AND ACETAMINOPHEN 5; 325 MG/1; MG/1
1 TABLET ORAL EVERY 4 HOURS
Qty: 0 | Refills: 0 | Status: DISCONTINUED | OUTPATIENT
Start: 2018-03-23 | End: 2018-03-27

## 2018-03-23 RX ORDER — SODIUM CHLORIDE 9 MG/ML
1000 INJECTION, SOLUTION INTRAVENOUS
Qty: 0 | Refills: 0 | Status: DISCONTINUED | OUTPATIENT
Start: 2018-03-23 | End: 2018-03-23

## 2018-03-23 RX ORDER — POTASSIUM CHLORIDE 20 MEQ
10 PACKET (EA) ORAL
Qty: 0 | Refills: 0 | Status: COMPLETED | OUTPATIENT
Start: 2018-03-23 | End: 2018-03-23

## 2018-03-23 RX ORDER — DEXTROSE 50 % IN WATER 50 %
25 SYRINGE (ML) INTRAVENOUS ONCE
Qty: 0 | Refills: 0 | Status: DISCONTINUED | OUTPATIENT
Start: 2018-03-23 | End: 2018-03-29

## 2018-03-23 RX ORDER — DEXTROSE 50 % IN WATER 50 %
1 SYRINGE (ML) INTRAVENOUS ONCE
Qty: 0 | Refills: 0 | Status: DISCONTINUED | OUTPATIENT
Start: 2018-03-23 | End: 2018-03-29

## 2018-03-23 RX ORDER — LIDOCAINE 4 G/100G
1 CREAM TOPICAL DAILY
Qty: 0 | Refills: 0 | Status: DISCONTINUED | OUTPATIENT
Start: 2018-03-23 | End: 2018-03-29

## 2018-03-23 RX ORDER — GLUCAGON INJECTION, SOLUTION 0.5 MG/.1ML
1 INJECTION, SOLUTION SUBCUTANEOUS ONCE
Qty: 0 | Refills: 0 | Status: DISCONTINUED | OUTPATIENT
Start: 2018-03-23 | End: 2018-03-29

## 2018-03-23 RX ORDER — DEXTROSE 50 % IN WATER 50 %
12.5 SYRINGE (ML) INTRAVENOUS ONCE
Qty: 0 | Refills: 0 | Status: DISCONTINUED | OUTPATIENT
Start: 2018-03-23 | End: 2018-03-29

## 2018-03-23 RX ORDER — ACETAMINOPHEN 500 MG
650 TABLET ORAL EVERY 6 HOURS
Qty: 0 | Refills: 0 | Status: DISCONTINUED | OUTPATIENT
Start: 2018-03-23 | End: 2018-03-27

## 2018-03-23 RX ORDER — POTASSIUM CHLORIDE 20 MEQ
40 PACKET (EA) ORAL EVERY 4 HOURS
Qty: 0 | Refills: 0 | Status: DISCONTINUED | OUTPATIENT
Start: 2018-03-23 | End: 2018-03-23

## 2018-03-23 RX ORDER — SODIUM CHLORIDE 9 MG/ML
1000 INJECTION, SOLUTION INTRAVENOUS
Qty: 0 | Refills: 0 | Status: DISCONTINUED | OUTPATIENT
Start: 2018-03-23 | End: 2018-03-27

## 2018-03-23 RX ORDER — FUROSEMIDE 40 MG
40 TABLET ORAL ONCE
Qty: 0 | Refills: 0 | Status: COMPLETED | OUTPATIENT
Start: 2018-03-23 | End: 2018-03-23

## 2018-03-23 RX ORDER — METOPROLOL TARTRATE 50 MG
5 TABLET ORAL ONCE
Qty: 0 | Refills: 0 | Status: COMPLETED | OUTPATIENT
Start: 2018-03-23 | End: 2018-03-23

## 2018-03-23 RX ADMIN — Medication 1 TABLET(S): at 17:17

## 2018-03-23 RX ADMIN — HALOPERIDOL DECANOATE 1 MILLIGRAM(S): 100 INJECTION INTRAMUSCULAR at 00:51

## 2018-03-23 RX ADMIN — OXYCODONE AND ACETAMINOPHEN 1 TABLET(S): 5; 325 TABLET ORAL at 15:00

## 2018-03-23 RX ADMIN — Medication 40 MILLIGRAM(S): at 06:10

## 2018-03-23 RX ADMIN — Medication 25 MILLIGRAM(S): at 06:11

## 2018-03-23 RX ADMIN — Medication 3 MILLIGRAM(S): at 22:45

## 2018-03-23 RX ADMIN — HEPARIN SODIUM 5000 UNIT(S): 5000 INJECTION INTRAVENOUS; SUBCUTANEOUS at 22:45

## 2018-03-23 RX ADMIN — Medication 25 MILLIGRAM(S): at 17:17

## 2018-03-23 RX ADMIN — Medication 40 MILLIEQUIVALENT(S): at 14:31

## 2018-03-23 RX ADMIN — Medication 100 MILLIEQUIVALENT(S): at 15:00

## 2018-03-23 RX ADMIN — AMLODIPINE BESYLATE 5 MILLIGRAM(S): 2.5 TABLET ORAL at 06:11

## 2018-03-23 RX ADMIN — Medication 5 MILLIGRAM(S): at 04:47

## 2018-03-23 RX ADMIN — Medication 325 MILLIGRAM(S): at 12:36

## 2018-03-23 RX ADMIN — HEPARIN SODIUM 5000 UNIT(S): 5000 INJECTION INTRAVENOUS; SUBCUTANEOUS at 14:31

## 2018-03-23 RX ADMIN — Medication 300 MILLIGRAM(S): at 12:36

## 2018-03-23 RX ADMIN — OXYCODONE AND ACETAMINOPHEN 1 TABLET(S): 5; 325 TABLET ORAL at 14:30

## 2018-03-23 RX ADMIN — LIDOCAINE 1 PATCH: 4 CREAM TOPICAL at 22:46

## 2018-03-23 RX ADMIN — Medication 100 MILLIEQUIVALENT(S): at 16:06

## 2018-03-23 RX ADMIN — Medication 1 TABLET(S): at 06:12

## 2018-03-23 RX ADMIN — Medication 40 MILLIEQUIVALENT(S): at 12:36

## 2018-03-23 RX ADMIN — HEPARIN SODIUM 5000 UNIT(S): 5000 INJECTION INTRAVENOUS; SUBCUTANEOUS at 06:10

## 2018-03-23 RX ADMIN — Medication 1: at 12:59

## 2018-03-23 NOTE — CONSULT NOTE ADULT - PROBLEM SELECTOR RECOMMENDATION 2
Apparent acute fracture line at L1 vertebra, Acute fracture line at L1 vertebra; pain relief with Percocet; if worsening pain, can add Morphine PRN

## 2018-03-23 NOTE — CHART NOTE - NSCHARTNOTEFT_GEN_A_CORE
paged earlier in the night regarding pt Hr elevated to 150-160s per telemetry  saw and examined patient at bedside, pt was confused, agitated, breath sounds congested on exam  gave one dose of IV haldol  pt began to later have episodes of HR to 180-190s per telemetry  pt continues to be restless  ordered CXr which shows some congestion, mostly on the left lung field  ordered IV lasix 40mg and metoprolol IV 5mg        primary team to follow-up

## 2018-03-23 NOTE — CONSULT NOTE ADULT - PROBLEM SELECTOR RECOMMENDATION 9
Bronchoscopy with biopsy  [facility]  Hem/onc eval
normocytic, mild renal insuff and hypercalcemia  will r/o myeloma and w/u for anemia
Multiple lung nodules, with likely bone mets; planned for likely bronch, oncology to f/u after reviewing CT scan

## 2018-03-23 NOTE — PROGRESS NOTE ADULT - PROBLEM SELECTOR PLAN 5
Niece HCP Jennifer Princess in [place]   cell [phone]  home [phone]    PCP Dane Alvarenga  NP [facility]  [phone]/ [phone]  WBC sept wnl - will monitor for leukocytosis on this admission

## 2018-03-23 NOTE — CONSULT NOTE ADULT - SUBJECTIVE AND OBJECTIVE BOX
86 y/o M pt with PMHx of Anemia (on Iron), HTN, gout and no significant PSHx BIBA to ED with syncopal episode today. On admission, found to have hypercalcemia and CT scan relieved lung mass, with likely mets. Oncology evaluation pending.     PAST MEDICAL & SURGICAL HISTORY:  Anemia  No significant past surgical history    SOCIAL HISTORY:    Admitted from:  home, lives alone  Substance abuse history:              Tobacco hx: Former                  Alcohol hx: Unknown             Home Opioid hx: None  Judaism: Cheondoism                                   Preferred Language: English    Surrogate/HCP/Guardian:  Jennifer Holm [phone]             FAMILY HISTORY:  No pertinent family history in first degree relatives    Baseline ADLs (prior to admission): Independent of ADLs    Allergies: No Known Allergies    Present Symptoms:   Dyspnea:   Nausea/Vomiting:   Anxiety:  Depressed   Fatigue:  Loss of appetite:   Pain:                                location:          Review of Systems: [All others negative or Unable to obtain due to poor mentation]    MEDICATIONS  (STANDING):  allopurinol 300 milliGRAM(s) Oral daily  amLODIPine   Tablet 5 milliGRAM(s) Oral daily  amoxicillin  875 milliGRAM(s)/clavulanate 1 Tablet(s) Oral every 12 hours  dextrose 5%. 1000 milliLiter(s) (50 mL/Hr) IV Continuous <Continuous>  dextrose 50% Injectable 12.5 Gram(s) IV Push once  dextrose 50% Injectable 25 Gram(s) IV Push once  dextrose 50% Injectable 25 Gram(s) IV Push once  ferrous    sulfate 325 milliGRAM(s) Oral daily  heparin  Injectable 5000 Unit(s) SubCutaneous every 8 hours  insulin lispro (HumaLOG) corrective regimen sliding scale   SubCutaneous Before meals and at bedtime  melatonin 3 milliGRAM(s) Oral at bedtime  metoprolol tartrate 25 milliGRAM(s) Oral two times a day  potassium chloride    Tablet ER 40 milliEquivalent(s) Oral every 4 hours    MEDICATIONS  (PRN):  acetaminophen   Tablet. 650 milliGRAM(s) Oral every 6 hours PRN Mild Pain (1 - 3)  dextrose Gel 1 Dose(s) Oral once PRN Blood Glucose LESS THAN 70 milliGRAM(s)/deciliter  glucagon  Injectable 1 milliGRAM(s) IntraMuscular once PRN Glucose LESS THAN 70 milligrams/deciliter    PHYSICAL EXAM:    Vital Signs Last 24 Hrs  T(C): 36.8 (23 Mar 2018 12:41), Max: 37.1 (23 Mar 2018 04:50)  T(F): 98.2 (23 Mar 2018 12:41), Max: 98.8 (23 Mar 2018 04:50)  HR: 98 (23 Mar 2018 12:41) (71 - 98)  BP: 132/56 (23 Mar 2018 12:41) (118/58 - 153/52)  RR: 22 (23 Mar 2018 12:41) (18 - 22)  SpO2: 95% (23 Mar 2018 12:41) (92% - 97%)    General: alert  oriented x ____    [lethargic distressed cachexia  nonverbal  unarousable verbal]  Karnofsky Performance Score/Palliative Performance Status Version2:     %    HEENT: normal  dry mouth  ET tube/trach oral lesions:  Lungs: comfortable tachypnea/labored breathing  excessive secretions  CV: normal  tachycardia  GI: normal  distended  tender  incontinent               PEG/NG/OG tube  constipation  last BM:   : normal  incontinent  oliguria/anuria  carter  Musculoskeletal: normal  weakness  edema             ambulatory  bedbound/wheelchair bound  Skin: Stage II sacrum  Neuro: no deficits cognitive impairment dsyphagia/dysarthria paresis: other:  Oral intake ability: unable/only mouth care [minimal moderate full capability]  Diet: [NPO]    LABS:                        8.0    11.3  )-----------( 163      ( 23 Mar 2018 07:47 )             25.8     03-23    151<H>  |  115<H>  |  42<H>  ----------------------------<  98  3.3<L>   |  23  |  0.88    Ca    11.5<H>      23 Mar 2018 07:47  Phos  2.6     03-23  Mg     2.2     03-23    RADIOLOGY & ADDITIONAL STUDIES: < from: CT Chest w/ IV Cont (03.22.18 @ 16:46) >  Impression: Mild bilateral pleural perfusions.    Small density with inclusion of air in the dependent trachea and the   right mainstem bronchus, suggestive of a mucous or aspirated material.    Enlarged mediastinal, left hilar, left supraclavicular, and retrocrural   lymph nodes, suspicious for metastatic lymphadenopathy.    7.8 x 9.4 cm masslike consolidation in the left lower lobe without air   bronchogram, suspicious for lung cancer. Multiple lung nodules   bilaterally measuring up to 1.9 cm in the left upper lobe, suspicious for   metastasis.    1.7 cm indeterminate hypodense left renal lesion. If clinically   indicated, renal ultrasound may be pursued for furtherevaluation.    The gallbladder is largely distended. No CT evidence for a gallstone.   Mild pericholecystic stranding and fluid adjacent to the gallbladder   fundus. If there is a clinical suspicion for acute cholecystitis,   gallbladder ultrasound and/or HIDA scan may be pursued for further   evaluation.    Multiple acute or subacute rib fractures bilaterally. Apparent subacute   fracture at the right scapula.    Apparent acute fracture line at L1 vertebra. Lytic lesion at L4 vertebra   suspicious for metastasis. If clinically indicated, lumbar spine MR may   be pursued for further evaluation.    There is a small lytic lesion in the right scapula. 5.2 cm soft tissue   mass with bony erosion at the lateral right clavicle. Sclerotic lesion at   the posterior right iliac bone. Findings are suspicious for osseous   metastasis.    < end of copied text >    ADVANCE DIRECTIVES: Full Code  Advanced Care Planning discussion total time spent: 86 y/o M pt with PMHx of Anemia (on Iron), HTN, gout and no significant PSHx BIBA to ED with syncopal episode today. On admission, found to have hypercalcemia and CT scan relieved lung mass, with likely mets. Oncology to f/u after review of CT scan results. Pulmonology planning possible bronchoscopy next week.    PAST MEDICAL & SURGICAL HISTORY:  Anemia  No significant past surgical history    SOCIAL HISTORY:    Admitted from:  home, lives alone  Substance abuse history:              Tobacco hx: Former                  Alcohol hx: Unknown             Home Opioid hx: None  Sikh: Religion                                   Preferred Language: English    Surrogate/HCP/Guardian:  Jennifer Holm [phone]             FAMILY HISTORY:  No pertinent family history in first degree relatives    Baseline ADLs (prior to admission): Independent of ADLs    Allergies: No Known Allergies    Present Symptoms:    Pain:   c/o severe back pain                                      Review of Systems: Unable to obtain due to poor mentation    MEDICATIONS  (STANDING):  allopurinol 300 milliGRAM(s) Oral daily  amLODIPine   Tablet 5 milliGRAM(s) Oral daily  amoxicillin  875 milliGRAM(s)/clavulanate 1 Tablet(s) Oral every 12 hours  dextrose 5%. 1000 milliLiter(s) (50 mL/Hr) IV Continuous <Continuous>  dextrose 50% Injectable 12.5 Gram(s) IV Push once  dextrose 50% Injectable 25 Gram(s) IV Push once  dextrose 50% Injectable 25 Gram(s) IV Push once  ferrous    sulfate 325 milliGRAM(s) Oral daily  heparin  Injectable 5000 Unit(s) SubCutaneous every 8 hours  insulin lispro (HumaLOG) corrective regimen sliding scale   SubCutaneous Before meals and at bedtime  melatonin 3 milliGRAM(s) Oral at bedtime  metoprolol tartrate 25 milliGRAM(s) Oral two times a day  potassium chloride    Tablet ER 40 milliEquivalent(s) Oral every 4 hours    MEDICATIONS  (PRN):  acetaminophen   Tablet. 650 milliGRAM(s) Oral every 6 hours PRN Mild Pain (1 - 3)  dextrose Gel 1 Dose(s) Oral once PRN Blood Glucose LESS THAN 70 milliGRAM(s)/deciliter  glucagon  Injectable 1 milliGRAM(s) IntraMuscular once PRN Glucose LESS THAN 70 milligrams/deciliter    PHYSICAL EXAM:    Vital Signs Last 24 Hrs  T(C): 36.8 (23 Mar 2018 12:41), Max: 37.1 (23 Mar 2018 04:50)  T(F): 98.2 (23 Mar 2018 12:41), Max: 98.8 (23 Mar 2018 04:50)  HR: 98 (23 Mar 2018 12:41) (71 - 98)  BP: 132/56 (23 Mar 2018 12:41) (118/58 - 153/52)  RR: 22 (23 Mar 2018 12:41) (18 - 22)  SpO2: 95% (23 Mar 2018 12:41) (92% - 97%)    General: alert  oriented x _1-2___  verbal  Karnofsky Performance Score/Palliative Performance Status Version2:  30%    HEENT: normal    Lungs: comfortable  CV: normal   GI: Incontinent  : Incontinent  Musculoskeletal: Weakness, bedbound/wheelchair bound  Skin: Stage II sacrum  Neuro: Cognitive impairment  Oral intake ability: Full capability  Diet: DASH    LABS:                        8.0    11.3  )-----------( 163      ( 23 Mar 2018 07:47 )             25.8     03-23    151<H>  |  115<H>  |  42<H>  ----------------------------<  98  3.3<L>   |  23  |  0.88    Ca    11.5<H>      23 Mar 2018 07:47  Phos  2.6     03-23  Mg     2.2     03-23    RADIOLOGY & ADDITIONAL STUDIES: < from: CT Chest w/ IV Cont (03.22.18 @ 16:46) >  Impression: Mild bilateral pleural perfusions.    Small density with inclusion of air in the dependent trachea and the   right mainstem bronchus, suggestive of a mucous or aspirated material.    Enlarged mediastinal, left hilar, left supraclavicular, and retrocrural   lymph nodes, suspicious for metastatic lymphadenopathy.    7.8 x 9.4 cm masslike consolidation in the left lower lobe without air   bronchogram, suspicious for lung cancer. Multiple lung nodules   bilaterally measuring up to 1.9 cm in the left upper lobe, suspicious for   metastasis.    1.7 cm indeterminate hypodense left renal lesion. If clinically   indicated, renal ultrasound may be pursued for furtherevaluation.    The gallbladder is largely distended. No CT evidence for a gallstone.   Mild pericholecystic stranding and fluid adjacent to the gallbladder   fundus. If there is a clinical suspicion for acute cholecystitis,   gallbladder ultrasound and/or HIDA scan may be pursued for further   evaluation.    Multiple acute or subacute rib fractures bilaterally. Apparent subacute   fracture at the right scapula.    Apparent acute fracture line at L1 vertebra. Lytic lesion at L4 vertebra   suspicious for metastasis. If clinically indicated, lumbar spine MR may   be pursued for further evaluation.    There is a small lytic lesion in the right scapula. 5.2 cm soft tissue   mass with bony erosion at the lateral right clavicle. Sclerotic lesion at   the posterior right iliac bone. Findings are suspicious for osseous   metastasis.    < end of copied text >    ADVANCE DIRECTIVES: Full Code

## 2018-03-23 NOTE — CONSULT NOTE ADULT - PROBLEM SELECTOR RECOMMENDATION 4
Patient currently confused, lacking capacity for medical decision making; spoke at length to his niece in [place] Jennifer Holm (home [phone], cell [phone]). She is aware of the current findings on the CT scan and she wants to further discuss options with Dr. Bailey. Her and her sister will likely want to pursue biopsy to get definitive diagnosis and treatment options. Her and her sister decided to make him DNR/DNI. She wants to speak with  to discuss starting a Medicaid application for possible LTC placement, potentially with hospice.

## 2018-03-23 NOTE — CONSULT NOTE ADULT - SUBJECTIVE AND OBJECTIVE BOX
PULMONARY CONSULT NOTE      TRACIE FRENCH  MRN-[id number]    Patient is a 87y old  Male who presents with a chief complaint of syncopal episode today, found on floor of apartment (21 Mar 2018 03:03)  Comments/Contacts	HCP niece who lives in [phone], [phone]	      History of Present Illness:  Reason for Admission: syncopal episode today, found on floor of apartment	  History of Present Illness: 	   88 y/o M pt with PMHx of Anemia (on Iron), HTN, gout and no significant PSHx BIBA to ED with syncopal episode today. EMS reports pt was found on the floor of his apartment. In ED, pt states he slipped and fell on his mattress on the night of 3/19, and was unable to get up until he was assisted by "policemen and a nurse" on 3/20. Pt notes recent dyspnea on exertion, and generalized pain everywhere after the fall. Pt denies LOC, head trauma, fever, headache, shortness of breath, chest pain, abdominal pain, dysuria, diarrhea. all other ROS negative.        HISTORY OF PRESENT ILLNESS: As above. Awake, alert, lying in bed in NAD    MEDICATIONS  (STANDING):  allopurinol 300 milliGRAM(s) Oral daily  amLODIPine   Tablet 5 milliGRAM(s) Oral daily  amoxicillin  875 milliGRAM(s)/clavulanate 1 Tablet(s) Oral every 12 hours  dextrose 5%. 1000 milliLiter(s) (50 mL/Hr) IV Continuous <Continuous>  dextrose 50% Injectable 12.5 Gram(s) IV Push once  dextrose 50% Injectable 25 Gram(s) IV Push once  dextrose 50% Injectable 25 Gram(s) IV Push once  ferrous    sulfate 325 milliGRAM(s) Oral daily  heparin  Injectable 5000 Unit(s) SubCutaneous every 8 hours  insulin lispro (HumaLOG) corrective regimen sliding scale   SubCutaneous Before meals and at bedtime  melatonin 3 milliGRAM(s) Oral at bedtime  metoprolol tartrate 25 milliGRAM(s) Oral two times a day  potassium chloride    Tablet ER 40 milliEquivalent(s) Oral every 4 hours      MEDICATIONS  (PRN):  acetaminophen   Tablet. 650 milliGRAM(s) Oral every 6 hours PRN Mild Pain (1 - 3)  dextrose Gel 1 Dose(s) Oral once PRN Blood Glucose LESS THAN 70 milliGRAM(s)/deciliter  glucagon  Injectable 1 milliGRAM(s) IntraMuscular once PRN Glucose LESS THAN 70 milligrams/deciliter      Allergies    No Known Allergies    Intolerances        PAST MEDICAL & SURGICAL HISTORY:  Anemia  No significant past surgical history      FAMILY HISTORY:  No pertinent family history in first degree relatives      SOCIAL HISTORY  Smoking History:     REVIEW OF SYSTEMS:    CONSTITUTIONAL:  No fevers, chills, sweats    HEENT:  Eyes:  No diplopia or blurred vision. ENT:  No earache, sore throat or runny nose.    CARDIOVASCULAR:  No pressure, squeezing, tightness, or heaviness about the chest; no palpitations.    RESPIRATORY:  Per HPI    GASTROINTESTINAL:  No abdominal pain, nausea, vomiting or diarrhea.    GENITOURINARY:  No dysuria, frequency or urgency.    NEUROLOGIC:  No paresthesias, fasciculations, seizures or weakness.    PSYCHIATRIC:  No disorder of thought or mood.    Vital Signs Last 24 Hrs  T(C): 36.8 (23 Mar 2018 12:41), Max: 37.1 (23 Mar 2018 04:50)  T(F): 98.2 (23 Mar 2018 12:41), Max: 98.8 (23 Mar 2018 04:50)  HR: 98 (23 Mar 2018 12:41) (71 - 98)  BP: 132/56 (23 Mar 2018 12:41) (118/58 - 153/54)  BP(mean): --  RR: 22 (23 Mar 2018 12:41) (18 - 22)  SpO2: 95% (23 Mar 2018 12:41) (92% - 97%)  I&O's Detail    22 Mar 2018 07:01  -  23 Mar 2018 07:00  --------------------------------------------------------  IN:  Total IN: 0 mL    OUT:    Stool: 3 mL  Total OUT: 3 mL    Total NET: -3 mL          PHYSICAL EXAMINATION:    GENERAL: The patient is a well-developed, well-nourished _____in no apparent distress.     HEENT: Head is normocephalic and atraumatic. Extraocular muscles are intact. Mucous membranes are moist.     NECK: Supple.     LUNGS: Clear to auscultation without wheezing, rales, or rhonchi. Respirations unlabored    HEART: Regular rate and rhythm without murmur.    ABDOMEN: Soft, nontender, and nondistended.  No hepatosplenomegaly is noted.    EXTREMITIES: Without any cyanosis, clubbing, rash, lesions or edema.    NEUROLOGIC: Grossly intact.      LABS:                        8.0    11.3  )-----------( 163      ( 23 Mar 2018 07:47 )             25.8     03-23    151<H>  |  115<H>  |  42<H>  ----------------------------<  98  3.3<L>   |  23  |  0.88    Ca    11.5<H>      23 Mar 2018 07:47  Phos  2.6     03-23  Mg     2.2     03-23            CARDIAC MARKERS ( 22 Mar 2018 06:32 )  x     / x     / 241 U/L / x     / x            Serum Pro-Brain Natriuretic Peptide: 07407 pg/mL (03-20-18 @ 21:17)      Procalcitonin, Serum: 1.07 ng/mL (03-21-18 @ 17:48)      MICROBIOLOGY:    RADIOLOGY & ADDITIONAL STUDIES:    CXR:    Ct scan chest:  < from: CT Chest w/ IV Cont (03.22.18 @ 16:46) >  Impression: Mild bilateral pleural perfusions.    Small density with inclusion of air in the dependent trachea and the   right mainstem bronchus, suggestive of a mucous or aspirated material.    Enlarged mediastinal, left hilar, left supraclavicular, and retrocrural   lymph nodes, suspicious for metastatic lymphadenopathy.    7.8 x 9.4 cm masslike consolidation in the left lower lobe without air   bronchogram, suspicious for lung cancer. Multiple lung nodules   bilaterally measuring up to 1.9 cm in the left upper lobe, suspicious for   metastasis.    1.7 cm indeterminate hypodense left renal lesion. If clinically   indicated, renal ultrasound may be pursued for furtherevaluation.    The gallbladder is largely distended. No CT evidence for a gallstone.   Mild pericholecystic stranding and fluid adjacent to the gallbladder   fundus. If there is a clinical suspicion for acute cholecystitis,   gallbladder ultrasound and/or HIDA scan may be pursued for further   evaluation.    Multiple acute or subacute rib fractures bilaterally. Apparent subacute   fracture at the right scapula.    Apparent acute fracture line at L1 vertebra. Lytic lesion at L4 vertebra   suspicious for metastasis. If clinically indicated, lumbar spine MR may   be pursued for further evaluation.    There is a small lytic lesion in the right scapula. 5.2 cm soft tissue   mass with bony erosion at the lateral right clavicle. Sclerotic lesion at   the posterior right iliac bone. Findings are suspicious for osseous   metastasis.    < end of copied text >    ekg;    echo:

## 2018-03-23 NOTE — PROGRESS NOTE ADULT - PROBLEM SELECTOR PLAN 2
continue with telemetry monitoring for 24 hrs  ECHO EF 55%, mild pHTN  carotid doppler wnl  tropx2 neg

## 2018-03-23 NOTE — PROGRESS NOTE ADULT - SUBJECTIVE AND OBJECTIVE BOX
very weak  has pain all over  exsmoker  large LLL mass, right clavicle mass, bone lesion, multiple mediastinal and hilar nodes  lung ca, stage 4  needs to discuss with family about options. very weak  has pain all over  exsmoker  large LLL mass, right clavicle mass, bone lesion, multiple mediastinal and hilar nodes  lung ca, stage 4  needs to discuss with family about options.  discussed with his niece, ECOG 4, not a candidate for chemo  the possibility of candidate for immunotherapy is not high  also if he goes to NH, there is no way to do immunotherapy  advise hospice

## 2018-03-24 DIAGNOSIS — E86.0 DEHYDRATION: ICD-10-CM

## 2018-03-24 LAB
ANION GAP SERPL CALC-SCNC: 11 MMOL/L — SIGNIFICANT CHANGE UP (ref 5–17)
BUN SERPL-MCNC: 45 MG/DL — HIGH (ref 7–18)
CALCIUM SERPL-MCNC: 12.2 MG/DL — HIGH (ref 8.4–10.5)
CHLORIDE SERPL-SCNC: 119 MMOL/L — HIGH (ref 96–108)
CO2 SERPL-SCNC: 25 MMOL/L — SIGNIFICANT CHANGE UP (ref 22–31)
CREAT SERPL-MCNC: 0.75 MG/DL — SIGNIFICANT CHANGE UP (ref 0.5–1.3)
GLUCOSE BLDC GLUCOMTR-MCNC: 104 MG/DL — HIGH (ref 70–99)
GLUCOSE BLDC GLUCOMTR-MCNC: 109 MG/DL — HIGH (ref 70–99)
GLUCOSE BLDC GLUCOMTR-MCNC: 122 MG/DL — HIGH (ref 70–99)
GLUCOSE BLDC GLUCOMTR-MCNC: 94 MG/DL — SIGNIFICANT CHANGE UP (ref 70–99)
GLUCOSE SERPL-MCNC: 91 MG/DL — SIGNIFICANT CHANGE UP (ref 70–99)
HBA1C BLD-MCNC: 5.6 % — SIGNIFICANT CHANGE UP (ref 4–5.6)
HCT VFR BLD CALC: 27.7 % — LOW (ref 39–50)
HGB BLD-MCNC: 8.5 G/DL — LOW (ref 13–17)
MAGNESIUM SERPL-MCNC: 2 MG/DL — SIGNIFICANT CHANGE UP (ref 1.6–2.6)
MCHC RBC-ENTMCNC: 27.7 PG — SIGNIFICANT CHANGE UP (ref 27–34)
MCHC RBC-ENTMCNC: 30.6 GM/DL — LOW (ref 32–36)
MCV RBC AUTO: 90.6 FL — SIGNIFICANT CHANGE UP (ref 80–100)
PHOSPHATE SERPL-MCNC: 2.3 MG/DL — LOW (ref 2.5–4.5)
PLATELET # BLD AUTO: 163 K/UL — SIGNIFICANT CHANGE UP (ref 150–400)
POTASSIUM SERPL-MCNC: 3.1 MMOL/L — LOW (ref 3.5–5.3)
POTASSIUM SERPL-SCNC: 3.1 MMOL/L — LOW (ref 3.5–5.3)
RBC # BLD: 3.05 M/UL — LOW (ref 4.2–5.8)
RBC # FLD: 16 % — HIGH (ref 10.3–14.5)
SODIUM SERPL-SCNC: 155 MMOL/L — HIGH (ref 135–145)
WBC # BLD: 10.3 K/UL — SIGNIFICANT CHANGE UP (ref 3.8–10.5)
WBC # FLD AUTO: 10.3 K/UL — SIGNIFICANT CHANGE UP (ref 3.8–10.5)

## 2018-03-24 PROCEDURE — 71045 X-RAY EXAM CHEST 1 VIEW: CPT | Mod: 26

## 2018-03-24 RX ORDER — PAMIDRONATE DISODIUM 9 MG/ML
60 INJECTION, SOLUTION INTRAVENOUS ONCE
Qty: 0 | Refills: 0 | Status: COMPLETED | OUTPATIENT
Start: 2018-03-24 | End: 2018-03-24

## 2018-03-24 RX ORDER — MORPHINE SULFATE 50 MG/1
1 CAPSULE, EXTENDED RELEASE ORAL ONCE
Qty: 0 | Refills: 0 | Status: DISCONTINUED | OUTPATIENT
Start: 2018-03-24 | End: 2018-03-24

## 2018-03-24 RX ORDER — POTASSIUM CHLORIDE 20 MEQ
40 PACKET (EA) ORAL EVERY 4 HOURS
Qty: 0 | Refills: 0 | Status: COMPLETED | OUTPATIENT
Start: 2018-03-24 | End: 2018-03-24

## 2018-03-24 RX ORDER — SODIUM,POTASSIUM PHOSPHATES 278-250MG
1 POWDER IN PACKET (EA) ORAL
Qty: 0 | Refills: 0 | Status: COMPLETED | OUTPATIENT
Start: 2018-03-24 | End: 2018-03-24

## 2018-03-24 RX ORDER — HALOPERIDOL DECANOATE 100 MG/ML
1 INJECTION INTRAMUSCULAR ONCE
Qty: 0 | Refills: 0 | Status: COMPLETED | OUTPATIENT
Start: 2018-03-24 | End: 2018-03-24

## 2018-03-24 RX ADMIN — Medication 1 TABLET(S): at 21:39

## 2018-03-24 RX ADMIN — Medication 1 TABLET(S): at 05:16

## 2018-03-24 RX ADMIN — MORPHINE SULFATE 1 MILLIGRAM(S): 50 CAPSULE, EXTENDED RELEASE ORAL at 11:03

## 2018-03-24 RX ADMIN — OXYCODONE AND ACETAMINOPHEN 1 TABLET(S): 5; 325 TABLET ORAL at 19:21

## 2018-03-24 RX ADMIN — OXYCODONE AND ACETAMINOPHEN 1 TABLET(S): 5; 325 TABLET ORAL at 06:11

## 2018-03-24 RX ADMIN — OXYCODONE AND ACETAMINOPHEN 1 TABLET(S): 5; 325 TABLET ORAL at 18:02

## 2018-03-24 RX ADMIN — Medication 3 MILLIGRAM(S): at 21:39

## 2018-03-24 RX ADMIN — PAMIDRONATE DISODIUM 65 MILLIGRAM(S): 9 INJECTION, SOLUTION INTRAVENOUS at 17:57

## 2018-03-24 RX ADMIN — Medication 40 MILLIEQUIVALENT(S): at 17:57

## 2018-03-24 RX ADMIN — Medication 325 MILLIGRAM(S): at 12:33

## 2018-03-24 RX ADMIN — Medication 40 MILLIEQUIVALENT(S): at 15:00

## 2018-03-24 RX ADMIN — OXYCODONE AND ACETAMINOPHEN 1 TABLET(S): 5; 325 TABLET ORAL at 05:15

## 2018-03-24 RX ADMIN — HEPARIN SODIUM 5000 UNIT(S): 5000 INJECTION INTRAVENOUS; SUBCUTANEOUS at 05:15

## 2018-03-24 RX ADMIN — Medication 300 MILLIGRAM(S): at 12:33

## 2018-03-24 RX ADMIN — LIDOCAINE 1 PATCH: 4 CREAM TOPICAL at 12:34

## 2018-03-24 RX ADMIN — Medication 1 TABLET(S): at 17:57

## 2018-03-24 RX ADMIN — LIDOCAINE 1 PATCH: 4 CREAM TOPICAL at 11:30

## 2018-03-24 RX ADMIN — HEPARIN SODIUM 5000 UNIT(S): 5000 INJECTION INTRAVENOUS; SUBCUTANEOUS at 15:00

## 2018-03-24 RX ADMIN — MORPHINE SULFATE 1 MILLIGRAM(S): 50 CAPSULE, EXTENDED RELEASE ORAL at 12:32

## 2018-03-24 RX ADMIN — Medication 40 MILLIEQUIVALENT(S): at 11:37

## 2018-03-24 RX ADMIN — HALOPERIDOL DECANOATE 1 MILLIGRAM(S): 100 INJECTION INTRAMUSCULAR at 11:03

## 2018-03-24 RX ADMIN — Medication 25 MILLIGRAM(S): at 05:16

## 2018-03-24 RX ADMIN — Medication 1 TABLET(S): at 17:56

## 2018-03-24 RX ADMIN — HEPARIN SODIUM 5000 UNIT(S): 5000 INJECTION INTRAVENOUS; SUBCUTANEOUS at 21:39

## 2018-03-24 RX ADMIN — Medication 1 TABLET(S): at 12:33

## 2018-03-24 RX ADMIN — Medication 25 MILLIGRAM(S): at 17:57

## 2018-03-24 RX ADMIN — Medication 1 TABLET(S): at 11:10

## 2018-03-24 NOTE — PROGRESS NOTE ADULT - SUBJECTIVE AND OBJECTIVE BOX
PGY 1 Note discussed with supervising resident and primary attending    Patient is a 87y old  Male who presents with a chief complaint of syncopal episode today, found on floor of apartment (21 Mar 2018 03:03)      INTERVAL HPI/OVERNIGHT EVENTS: Pt was seen and examined at bedside. No acute events overnight. Pt was agitated this morning prior to MRI imaging - administered 1mg haldol    MEDICATIONS  (STANDING):  allopurinol 300 milliGRAM(s) Oral daily  amLODIPine   Tablet 5 milliGRAM(s) Oral daily  amoxicillin  875 milliGRAM(s)/clavulanate 1 Tablet(s) Oral every 12 hours  dextrose 5%. 1000 milliLiter(s) (50 mL/Hr) IV Continuous <Continuous>  dextrose 50% Injectable 12.5 Gram(s) IV Push once  dextrose 50% Injectable 25 Gram(s) IV Push once  dextrose 50% Injectable 25 Gram(s) IV Push once  ferrous    sulfate 325 milliGRAM(s) Oral daily  heparin  Injectable 5000 Unit(s) SubCutaneous every 8 hours  insulin lispro (HumaLOG) corrective regimen sliding scale   SubCutaneous Before meals and at bedtime  lidocaine   Patch 1 Patch Transdermal daily  melatonin 3 milliGRAM(s) Oral at bedtime  metoprolol tartrate 25 milliGRAM(s) Oral two times a day  potassium acid phosphate/sodium acid phosphate tablet (K-PHOS No. 2) 1 Tablet(s) Oral four times a day with meals  potassium chloride    Tablet ER 40 milliEquivalent(s) Oral every 4 hours    MEDICATIONS  (PRN):  acetaminophen   Tablet. 650 milliGRAM(s) Oral every 6 hours PRN Mild Pain (1 - 3)  dextrose Gel 1 Dose(s) Oral once PRN Blood Glucose LESS THAN 70 milliGRAM(s)/deciliter  glucagon  Injectable 1 milliGRAM(s) IntraMuscular once PRN Glucose LESS THAN 70 milligrams/deciliter  oxyCODONE    5 mG/acetaminophen 325 mG 1 Tablet(s) Oral every 4 hours PRN Moderate Pain (4 - 6)  traMADol 25 milliGRAM(s) Oral every 6 hours PRN Severe Pain (7 - 10)      __________________________________________________  REVIEW OF SYSTEMS:    CONSTITUTIONAL: No fever, chills, night sweats  RESPIRATORY: No cough; No shortness of breath  CARDIOVASCULAR: No chest pain, no palpitations  GASTROINTESTINAL: No pain. No nausea or vomiting; No diarrhea  ALL OTHER ROS negative        Vital Signs Last 24 Hrs  T(C): 36.7 (24 Mar 2018 05:10), Max: 36.9 (23 Mar 2018 14:27)  T(F): 98 (24 Mar 2018 05:10), Max: 98.4 (23 Mar 2018 14:27)  HR: 127 (24 Mar 2018 05:10) (65 - 127)  BP: 109/67 (24 Mar 2018 05:10) (109/67 - 138/50)  BP(mean): --  RR: 19 (24 Mar 2018 05:10) (16 - 24)  SpO2: 96% (24 Mar 2018 05:10) (91% - 100%)    ________________________________________________  PHYSICAL EXAM:  GENERAL: NAD. Well appearing, well nourished. Normal mood & affect.   HEENT: Normocephalic;  conjunctivae and sclerae clear; moist mucous membranes;   NECK : supple  CHEST/LUNG: Clear to auscultation bilaterally with good air entry   HEART: S1 S2  regular; no murmurs, gallops or rubs  ABDOMEN: Soft, Nontender, Nondistended; Bowel sounds present  EXTREMITIES: no cyanosis; no edema; no calf tenderness; peripheral pulses intact  SKIN: warm and dry; no rash  NERVOUS SYSTEM:  Awake       _________________________________________________  LABS:                        8.5    10.3  )-----------( 163      ( 24 Mar 2018 06:59 )             27.7     03-24    155<H>  |  119<H>  |  45<H>  ----------------------------<  91  3.1<L>   |  25  |  0.75    Ca    12.2<H>      24 Mar 2018 06:59  Phos  2.3     03-24  Mg     2.0     03-24          CAPILLARY BLOOD GLUCOSE      POCT Blood Glucose.: 104 mg/dL (24 Mar 2018 12:21)  POCT Blood Glucose.: 109 mg/dL (24 Mar 2018 08:01)  POCT Blood Glucose.: 111 mg/dL (23 Mar 2018 22:43)  POCT Blood Glucose.: 117 mg/dL (23 Mar 2018 16:55)    Consultant(s) Notes Reviewed:   YES    Care Discussed with PGY-2/Attending/Consultants :  YES    Plan of care was discussed with patient and /or primary care giver; all questions and concerns were addressed and care was aligned with patient's wishes.

## 2018-03-24 NOTE — PROGRESS NOTE ADULT - PROBLEM SELECTOR PLAN 4
CXR - Scattered amorphous lung densities and a sizable hiatal   hernia.  CT head shows possible sinusitis  c/w augmentin Day 4 - sinusitis   procalcitonin 1.07

## 2018-03-24 NOTE — PROGRESS NOTE ADULT - SUBJECTIVE AND OBJECTIVE BOX
Patient is a 87y old  Male who presents with a chief complaint of syncopal episode today, found on floor of apartment (21 Mar 2018 03:03)  Sedated but comfortable lying in bed in NAD    INTERVAL HPI/OVERNIGHT EVENTS:      VITAL SIGNS:  T(F): 98 (03-24-18 @ 05:10)  HR: 127 (03-24-18 @ 05:10)  BP: 109/67 (03-24-18 @ 05:10)  RR: 19 (03-24-18 @ 05:10)  SpO2: 96% (03-24-18 @ 05:10)  Wt(kg): --  I&O's Detail    23 Mar 2018 07:01  -  24 Mar 2018 07:00  --------------------------------------------------------  IN:  Total IN: 0 mL    OUT:    Voided: 550 mL  Total OUT: 550 mL    Total NET: -550 mL              REVIEW OF SYSTEMS:    CONSTITUTIONAL:  No fevers, chills, sweats    HEENT:  Eyes:  No diplopia or blurred vision. ENT:  No earache, sore throat or runny nose.    CARDIOVASCULAR:  No pressure, squeezing, tightness, or heaviness about the chest; no palpitations.    RESPIRATORY:  Per HPI    GASTROINTESTINAL:  No abdominal pain, nausea, vomiting or diarrhea.    GENITOURINARY:  No dysuria, frequency or urgency.    NEUROLOGIC:  No paresthesias, fasciculations, seizures or weakness.    PSYCHIATRIC:  No disorder of thought or mood.      PHYSICAL EXAM:    Constitutional: Well developed and nourished  Eyes:Perrla  ENMT: normal  Neck:supple  Respiratory: good air entry  Cardiovascular: S1 S2 regular  Gastrointestinal: Soft, Non tender  Extremities: No edema  Vascular:normal  Neurological:Awake, alert,Ox3  Musculoskeletal:Normal      MEDICATIONS  (STANDING):  allopurinol 300 milliGRAM(s) Oral daily  amLODIPine   Tablet 5 milliGRAM(s) Oral daily  amoxicillin  875 milliGRAM(s)/clavulanate 1 Tablet(s) Oral every 12 hours  dextrose 5%. 1000 milliLiter(s) (50 mL/Hr) IV Continuous <Continuous>  dextrose 50% Injectable 12.5 Gram(s) IV Push once  dextrose 50% Injectable 25 Gram(s) IV Push once  dextrose 50% Injectable 25 Gram(s) IV Push once  ferrous    sulfate 325 milliGRAM(s) Oral daily  heparin  Injectable 5000 Unit(s) SubCutaneous every 8 hours  insulin lispro (HumaLOG) corrective regimen sliding scale   SubCutaneous Before meals and at bedtime  lidocaine   Patch 1 Patch Transdermal daily  melatonin 3 milliGRAM(s) Oral at bedtime  metoprolol tartrate 25 milliGRAM(s) Oral two times a day  potassium acid phosphate/sodium acid phosphate tablet (K-PHOS No. 2) 1 Tablet(s) Oral four times a day with meals  potassium chloride    Tablet ER 40 milliEquivalent(s) Oral every 4 hours    MEDICATIONS  (PRN):  acetaminophen   Tablet. 650 milliGRAM(s) Oral every 6 hours PRN Mild Pain (1 - 3)  dextrose Gel 1 Dose(s) Oral once PRN Blood Glucose LESS THAN 70 milliGRAM(s)/deciliter  glucagon  Injectable 1 milliGRAM(s) IntraMuscular once PRN Glucose LESS THAN 70 milligrams/deciliter  oxyCODONE    5 mG/acetaminophen 325 mG 1 Tablet(s) Oral every 4 hours PRN Moderate Pain (4 - 6)  traMADol 25 milliGRAM(s) Oral every 6 hours PRN Severe Pain (7 - 10)      Allergies    No Known Allergies    Intolerances        LABS:                        8.5    10.3  )-----------( 163      ( 24 Mar 2018 06:59 )             27.7     03-24    155<H>  |  119<H>  |  45<H>  ----------------------------<  91  3.1<L>   |  25  |  0.75    Ca    12.2<H>      24 Mar 2018 06:59  Phos  2.3     03-24  Mg     2.0     03-24                CAPILLARY BLOOD GLUCOSE      POCT Blood Glucose.: 104 mg/dL (24 Mar 2018 12:21)  POCT Blood Glucose.: 109 mg/dL (24 Mar 2018 08:01)  POCT Blood Glucose.: 111 mg/dL (23 Mar 2018 22:43)  POCT Blood Glucose.: 117 mg/dL (23 Mar 2018 16:55)    pro-bnp [id number] 03-20 @ 21:17     d-dimer --  03-20 @ 21:17      RADIOLOGY & ADDITIONAL TESTS:    CXR:    Ct scan chest:  < from: CT Chest w/ IV Cont (03.22.18 @ 16:46) >  Impression: Mild bilateral pleural perfusions.    Small density with inclusion of air in the dependent trachea and the   right mainstem bronchus, suggestive of a mucous or aspirated material.    Enlarged mediastinal, left hilar, left supraclavicular, and retrocrural   lymph nodes, suspicious for metastatic lymphadenopathy.    7.8 x 9.4 cm masslike consolidation in the left lower lobe without air   bronchogram, suspicious for lung cancer. Multiple lung nodules   bilaterally measuring up to 1.9 cm in the left upper lobe, suspicious for   metastasis.    1.7 cm indeterminate hypodense left renal lesion. If clinically   indicated, renal ultrasound may be pursued for furtherevaluation.    The gallbladder is largely distended. No CT evidence for a gallstone.   Mild pericholecystic stranding and fluid adjacent to the gallbladder   fundus. If there is a clinical suspicion for acute cholecystitis,   gallbladder ultrasound and/or HIDA scan may be pursued for further   evaluation.    Multiple acute or subacute rib fractures bilaterally. Apparent subacute   fracture at the right scapula.    Apparent acute fracture line at L1 vertebra. Lytic lesion at L4 vertebra   suspicious for metastasis. If clinically indicated, lumbar spine MR may   be pursued for further evaluation.    There is a small lytic lesion in the right scapula. 5.2 cm soft tissue   mass with bony erosion at the lateral right clavicle. Sclerotic lesion at   the posterior right iliac bone. Findings are suspicious for osseous   metastasis.    < end of copied text >    ekg;    echo:

## 2018-03-24 NOTE — PROGRESS NOTE ADULT - PROBLEM SELECTOR PLAN 5
Niece HCP Jennifer Princess in [place]   cell [phone]  home [phone]    PCP Dane Alvarenga  NP Ms Bray  [phone]/ [phone]  WBC sept wnl - will monitor for leukocytosis on this admission

## 2018-03-24 NOTE — PROGRESS NOTE ADULT - SUBJECTIVE AND OBJECTIVE BOX
very lethargic  pain is under control  no sob or fever  will give pamidronate  await family decision.

## 2018-03-24 NOTE — PROGRESS NOTE ADULT - SUBJECTIVE AND OBJECTIVE BOX
INTERVAL HPI/OVERNIGHT EVENTS:  no acute events foro overnight,awake,weak    VITAL SIGNS:  T(F): 98 (03-24-18 @ 05:10)  HR: 127 (03-24-18 @ 05:10)  BP: 109/67 (03-24-18 @ 05:10)  RR: 19 (03-24-18 @ 05:10)  SpO2: 96% (03-24-18 @ 05:10)  Wt(kg): --    PHYSICAL EXAM:awake    Constitutional:  Eyes:  ENMT:perrla  Neck:  Respiratory:few rales  Cardiovascular:s1 s2,m-none  Gastrointestinal:soft,non-tender  Extremities:  Vascular:  Neurological:no focal deficit  Musculoskeletal:    MEDICATIONS  (STANDING):  allopurinol 300 milliGRAM(s) Oral daily  amLODIPine   Tablet 5 milliGRAM(s) Oral daily  amoxicillin  875 milliGRAM(s)/clavulanate 1 Tablet(s) Oral every 12 hours  dextrose 5%. 1000 milliLiter(s) (50 mL/Hr) IV Continuous <Continuous>  dextrose 50% Injectable 12.5 Gram(s) IV Push once  dextrose 50% Injectable 25 Gram(s) IV Push once  dextrose 50% Injectable 25 Gram(s) IV Push once  ferrous    sulfate 325 milliGRAM(s) Oral daily  heparin  Injectable 5000 Unit(s) SubCutaneous every 8 hours  insulin lispro (HumaLOG) corrective regimen sliding scale   SubCutaneous Before meals and at bedtime  lidocaine   Patch 1 Patch Transdermal daily  melatonin 3 milliGRAM(s) Oral at bedtime  metoprolol tartrate 25 milliGRAM(s) Oral two times a day  potassium acid phosphate/sodium acid phosphate tablet (K-PHOS No. 2) 1 Tablet(s) Oral four times a day with meals  potassium chloride    Tablet ER 40 milliEquivalent(s) Oral every 4 hours    MEDICATIONS  (PRN):  acetaminophen   Tablet. 650 milliGRAM(s) Oral every 6 hours PRN Mild Pain (1 - 3)  dextrose Gel 1 Dose(s) Oral once PRN Blood Glucose LESS THAN 70 milliGRAM(s)/deciliter  glucagon  Injectable 1 milliGRAM(s) IntraMuscular once PRN Glucose LESS THAN 70 milligrams/deciliter  oxyCODONE    5 mG/acetaminophen 325 mG 1 Tablet(s) Oral every 4 hours PRN Moderate Pain (4 - 6)  traMADol 25 milliGRAM(s) Oral every 6 hours PRN Severe Pain (7 - 10)      Allergies    No Known Allergies    Intolerances        LABS:                        8.5    10.3  )-----------( 163      ( 24 Mar 2018 06:59 )             27.7     03-24    155<H>  |  119<H>  |  45<H>  ----------------------------<  91  3.1<L>   |  25  |  0.75    Ca    12.2<H>      24 Mar 2018 06:59  Phos  2.3     03-24  Mg     2.0     03-24        Assessment and Plan:   · Assessment		  88 y/o M pt with PMHx of Anemia (on Iron), HTN, gout and no significant PSHx BIBA to ED with syncopal episode today. EMS reports pt was found on the floor of his apartment. In ED, pt states he slipped and fell on his mattress on the night of 3/19, and was unable to get up until he was assisted by "policemen and a nurse" on 3/20. Pt notes recent dyspnea on exertion, and generalized pain everywhere after the fall. Pt denies LOC, head trauma.     Pt admitted for evaluation of possible syncope given unwitnessed fall along with rhabdomyolysis.      Problem/Plan - 1:  ·  Problem: Lung mass.  Plan: Pulmonary Dr Storm consulted  Palliative Brooke LITTLE consulted  rec bronch next week   d/w HCP Jennifer - decision pending.      Problem/Plan - 2:  ·  Problem: R/O Syncope.-stable clinically  ECHO EF 55%, mild pHTN  carotid doppler wnl       Problem/Plan - 3:  ·  Problem: Rib fracture.  Plan: pain control as needed  morphine PRN.      Problem/Plan - 4:  ·  Problem: Leukocytosis.  Plan: CXR - Scattered amorphous lung densities and a sizable hiatal   hernia.  f/u CT chest/abd w/ IV cont r/o neoplastic processes  c/w augmentin Day 3 - sinusitis   procalcitonin 1.07  f/u UCx.      Problem/Plan - 5:  ·  Problem: Goals of care, counseling/discussion.  Plan: Niece HCP Jennifer Sánchez in [place]   cell [phone]  home [phone]    PCP Dane Alvarenga  NP Ms Bray  [phone]/ [phone]     Problem/Plan - 6:  Problem: Hypercalcemia. Plan: heme/onc - Dr. Bailey  Ca 12.6  intact PTH 10  Vit D 33.     Problem/Plan - 7:  ·  Problem: Rhabdomyolysis.  Plan: Resolved  lactate 1.9.      Problem/Plan - 8:  ·  Problem: Anemia.  Plan: c/w iron supplementation.      Problem/Plan - 9:  ·  Problem: Need for prophylactic measure.  Plan: IMPROVE VTE Individual Risk Assessment

## 2018-03-25 LAB
ANION GAP SERPL CALC-SCNC: 11 MMOL/L — SIGNIFICANT CHANGE UP (ref 5–17)
BUN SERPL-MCNC: 41 MG/DL — HIGH (ref 7–18)
CALCIUM SERPL-MCNC: 12.2 MG/DL — HIGH (ref 8.4–10.5)
CHLORIDE SERPL-SCNC: 120 MMOL/L — HIGH (ref 96–108)
CO2 SERPL-SCNC: 26 MMOL/L — SIGNIFICANT CHANGE UP (ref 22–31)
CREAT SERPL-MCNC: 0.96 MG/DL — SIGNIFICANT CHANGE UP (ref 0.5–1.3)
GLUCOSE BLDC GLUCOMTR-MCNC: 118 MG/DL — HIGH (ref 70–99)
GLUCOSE BLDC GLUCOMTR-MCNC: 122 MG/DL — HIGH (ref 70–99)
GLUCOSE BLDC GLUCOMTR-MCNC: 126 MG/DL — HIGH (ref 70–99)
GLUCOSE BLDC GLUCOMTR-MCNC: 140 MG/DL — HIGH (ref 70–99)
GLUCOSE SERPL-MCNC: 109 MG/DL — HIGH (ref 70–99)
HCT VFR BLD CALC: 31.8 % — LOW (ref 39–50)
HGB BLD-MCNC: 9.4 G/DL — LOW (ref 13–17)
MAGNESIUM SERPL-MCNC: 2.2 MG/DL — SIGNIFICANT CHANGE UP (ref 1.6–2.6)
MCHC RBC-ENTMCNC: 26.8 PG — LOW (ref 27–34)
MCHC RBC-ENTMCNC: 29.4 GM/DL — LOW (ref 32–36)
MCV RBC AUTO: 91.2 FL — SIGNIFICANT CHANGE UP (ref 80–100)
PHOSPHATE SERPL-MCNC: 2.1 MG/DL — LOW (ref 2.5–4.5)
PLATELET # BLD AUTO: 177 K/UL — SIGNIFICANT CHANGE UP (ref 150–400)
POTASSIUM SERPL-MCNC: 3.5 MMOL/L — SIGNIFICANT CHANGE UP (ref 3.5–5.3)
POTASSIUM SERPL-SCNC: 3.5 MMOL/L — SIGNIFICANT CHANGE UP (ref 3.5–5.3)
RBC # BLD: 3.49 M/UL — LOW (ref 4.2–5.8)
RBC # FLD: 16.5 % — HIGH (ref 10.3–14.5)
SODIUM SERPL-SCNC: 157 MMOL/L — HIGH (ref 135–145)
WBC # BLD: 12 K/UL — HIGH (ref 3.8–10.5)
WBC # FLD AUTO: 12 K/UL — HIGH (ref 3.8–10.5)

## 2018-03-25 RX ORDER — HALOPERIDOL DECANOATE 100 MG/ML
0.5 INJECTION INTRAMUSCULAR ONCE
Qty: 0 | Refills: 0 | Status: COMPLETED | OUTPATIENT
Start: 2018-03-25 | End: 2018-03-25

## 2018-03-25 RX ADMIN — Medication 25 MILLIGRAM(S): at 05:48

## 2018-03-25 RX ADMIN — HALOPERIDOL DECANOATE 0.5 MILLIGRAM(S): 100 INJECTION INTRAMUSCULAR at 00:30

## 2018-03-25 RX ADMIN — HEPARIN SODIUM 5000 UNIT(S): 5000 INJECTION INTRAVENOUS; SUBCUTANEOUS at 05:49

## 2018-03-25 RX ADMIN — LIDOCAINE 1 PATCH: 4 CREAM TOPICAL at 00:39

## 2018-03-25 RX ADMIN — HEPARIN SODIUM 5000 UNIT(S): 5000 INJECTION INTRAVENOUS; SUBCUTANEOUS at 22:02

## 2018-03-25 RX ADMIN — LIDOCAINE 1 PATCH: 4 CREAM TOPICAL at 11:50

## 2018-03-25 RX ADMIN — LIDOCAINE 1 PATCH: 4 CREAM TOPICAL at 23:55

## 2018-03-25 RX ADMIN — Medication 325 MILLIGRAM(S): at 11:50

## 2018-03-25 RX ADMIN — Medication 3 MILLIGRAM(S): at 22:02

## 2018-03-25 RX ADMIN — Medication 1 TABLET(S): at 05:48

## 2018-03-25 RX ADMIN — Medication 1 TABLET(S): at 17:00

## 2018-03-25 RX ADMIN — Medication 25 MILLIGRAM(S): at 17:00

## 2018-03-25 RX ADMIN — OXYCODONE AND ACETAMINOPHEN 1 TABLET(S): 5; 325 TABLET ORAL at 12:59

## 2018-03-25 RX ADMIN — HEPARIN SODIUM 5000 UNIT(S): 5000 INJECTION INTRAVENOUS; SUBCUTANEOUS at 13:01

## 2018-03-25 RX ADMIN — Medication 300 MILLIGRAM(S): at 11:58

## 2018-03-25 RX ADMIN — OXYCODONE AND ACETAMINOPHEN 1 TABLET(S): 5; 325 TABLET ORAL at 11:51

## 2018-03-25 RX ADMIN — AMLODIPINE BESYLATE 5 MILLIGRAM(S): 2.5 TABLET ORAL at 05:48

## 2018-03-25 NOTE — PROGRESS NOTE ADULT - SUBJECTIVE AND OBJECTIVE BOX
INTERVAL HPI/OVERNIGHT EVENTS:awake,poor historian  noacute events for overnight    VITAL SIGNS:  T(F): 98.1 (03-25-18 @ 05:22)  HR: 88 (03-25-18 @ 05:22)  BP: 157/75 (03-25-18 @ 05:22)  RR: 18 (03-25-18 @ 05:22)  SpO2: 94% (03-25-18 @ 05:22)  Wt(kg): --    PHYSICAL EXAM:awake    Constitutional:  Eyes:  ENMT:perlla  Neck:  Respiratory:few rales  Cardiovascular:s1 s2,m-none  Gastrointestinal:soft,nontender  Extremities:  Vascular:  Neurological:no focal defiict  Musculoskeletal:    MEDICATIONS  (STANDING):  allopurinol 300 milliGRAM(s) Oral daily  amLODIPine   Tablet 5 milliGRAM(s) Oral daily  amoxicillin  875 milliGRAM(s)/clavulanate 1 Tablet(s) Oral every 12 hours  dextrose 5%. 1000 milliLiter(s) (50 mL/Hr) IV Continuous <Continuous>  dextrose 50% Injectable 12.5 Gram(s) IV Push once  dextrose 50% Injectable 25 Gram(s) IV Push once  dextrose 50% Injectable 25 Gram(s) IV Push once  ferrous    sulfate 325 milliGRAM(s) Oral daily  heparin  Injectable 5000 Unit(s) SubCutaneous every 8 hours  insulin lispro (HumaLOG) corrective regimen sliding scale   SubCutaneous Before meals and at bedtime  lidocaine   Patch 1 Patch Transdermal daily  melatonin 3 milliGRAM(s) Oral at bedtime  metoprolol tartrate 25 milliGRAM(s) Oral two times a day    MEDICATIONS  (PRN):  acetaminophen   Tablet. 650 milliGRAM(s) Oral every 6 hours PRN Mild Pain (1 - 3)  dextrose Gel 1 Dose(s) Oral once PRN Blood Glucose LESS THAN 70 milliGRAM(s)/deciliter  glucagon  Injectable 1 milliGRAM(s) IntraMuscular once PRN Glucose LESS THAN 70 milligrams/deciliter  oxyCODONE    5 mG/acetaminophen 325 mG 1 Tablet(s) Oral every 4 hours PRN Moderate Pain (4 - 6)  traMADol 25 milliGRAM(s) Oral every 6 hours PRN Severe Pain (7 - 10)      Allergies    No Known Allergies    Intolerances        LABS:                        9.4    12.0  )-----------( 177      ( 25 Mar 2018 05:55 )             31.8     03-25    157<H>  |  120<H>  |  41<H>  ----------------------------<  109<H>  3.5   |  26  |  0.96    Ca    12.2<H>      25 Mar 2018 05:55  Phos  2.1     03-25  Mg     2.2     03-25    Assessment and Plan:   · Assessment		  86 y/o M pt with PMHx of Anemia (on Iron), HTN, gout and no significant PSHx BIBA to ED with syncopal episode today. EMS reports pt was found on the floor of his apartment. In ED, pt states he slipped and fell on his mattress on the night of 3/19, and was unable to get up until he was assisted by "policemen and a nurse" on 3/20. Pt notes recent dyspnea on exertion, and generalized pain everywhere after the fall. Pt denies LOC, head trauma.     Pt admitted for evaluation of possible syncope given unwitnessed fall along with rhabdomyolysis.      Problem/Plan - 1:  ·  Problem: Lung mass.  Plan: Pulmonary Dr Storm consulted  Palliative Brooke NP consulted  rec bronch next week   d/w HCP Jennifer - decision pending.      Problem/Plan - 2:  ·  Problem: R/O Syncope.-stable clinically  ECHO EF 55%, mild pHTN  carotid doppler wnl       Problem/Plan - 3:  ·  Problem: Rib fracture.  Plan: pain control as needed  morphine PRN.      Problem/Plan - 4:  ·  Problem: Leukocytosis.  Plan: CXR - Scattered amorphous lung densities and a sizable hiatal   hernia.  f/u CT chest/abd w/ IV cont r/o neoplastic processes  c/w augmentin Day 3 - sinusitis   procalcitonin 1.07  f/u UCx.      Problem/Plan - 5:  ·  Problem: Goals of care, counseling/discussion.  Plan: Niece HCP Jennifer Sánchez in [place]   cell [phone]  home [phone]    PCP Dane Alvarenga  NP Ms Bray  [phone]/ [phone]     Problem/Plan - 6:  Problem: Hypercalcemia. Plan: heme/onc - Dr. Bailey  Ca 12.6  intact PTH 10  Vit D 33.  0n pamidronate        Problem/Plan - 8:  ·  Problem: Anemia.  Plan: c/w iron supplementation.      Problem/Plan - 9:  ·  Problem: Need for prophylactic measure.  Plan: IMPROVE VTE Individual Risk Assessment

## 2018-03-26 LAB
ANION GAP SERPL CALC-SCNC: 10 MMOL/L — SIGNIFICANT CHANGE UP (ref 5–17)
ANION GAP SERPL CALC-SCNC: 9 MMOL/L — SIGNIFICANT CHANGE UP (ref 5–17)
BUN SERPL-MCNC: 35 MG/DL — HIGH (ref 7–18)
BUN SERPL-MCNC: 40 MG/DL — HIGH (ref 7–18)
CALCIUM SERPL-MCNC: 11 MG/DL — HIGH (ref 8.4–10.5)
CALCIUM SERPL-MCNC: 11.3 MG/DL — HIGH (ref 8.4–10.5)
CHLORIDE SERPL-SCNC: 123 MMOL/L — HIGH (ref 96–108)
CHLORIDE SERPL-SCNC: 125 MMOL/L — HIGH (ref 96–108)
CO2 SERPL-SCNC: 28 MMOL/L — SIGNIFICANT CHANGE UP (ref 22–31)
CO2 SERPL-SCNC: 28 MMOL/L — SIGNIFICANT CHANGE UP (ref 22–31)
CREAT SERPL-MCNC: 0.89 MG/DL — SIGNIFICANT CHANGE UP (ref 0.5–1.3)
CREAT SERPL-MCNC: 0.93 MG/DL — SIGNIFICANT CHANGE UP (ref 0.5–1.3)
GLUCOSE BLDC GLUCOMTR-MCNC: 123 MG/DL — HIGH (ref 70–99)
GLUCOSE BLDC GLUCOMTR-MCNC: 124 MG/DL — HIGH (ref 70–99)
GLUCOSE BLDC GLUCOMTR-MCNC: 130 MG/DL — HIGH (ref 70–99)
GLUCOSE BLDC GLUCOMTR-MCNC: 137 MG/DL — HIGH (ref 70–99)
GLUCOSE SERPL-MCNC: 105 MG/DL — HIGH (ref 70–99)
GLUCOSE SERPL-MCNC: 118 MG/DL — HIGH (ref 70–99)
HCT VFR BLD CALC: 32.1 % — LOW (ref 39–50)
HGB BLD-MCNC: 10 G/DL — LOW (ref 13–17)
MAGNESIUM SERPL-MCNC: 2.1 MG/DL — SIGNIFICANT CHANGE UP (ref 1.6–2.6)
MCHC RBC-ENTMCNC: 28.4 PG — SIGNIFICANT CHANGE UP (ref 27–34)
MCHC RBC-ENTMCNC: 31.1 GM/DL — LOW (ref 32–36)
MCV RBC AUTO: 91.4 FL — SIGNIFICANT CHANGE UP (ref 80–100)
PHOSPHATE SERPL-MCNC: 2 MG/DL — LOW (ref 2.5–4.5)
PLATELET # BLD AUTO: 182 K/UL — SIGNIFICANT CHANGE UP (ref 150–400)
POTASSIUM SERPL-MCNC: 3.3 MMOL/L — LOW (ref 3.5–5.3)
POTASSIUM SERPL-MCNC: 3.3 MMOL/L — LOW (ref 3.5–5.3)
POTASSIUM SERPL-SCNC: 3.3 MMOL/L — LOW (ref 3.5–5.3)
POTASSIUM SERPL-SCNC: 3.3 MMOL/L — LOW (ref 3.5–5.3)
RBC # BLD: 3.52 M/UL — LOW (ref 4.2–5.8)
RBC # FLD: 16.6 % — HIGH (ref 10.3–14.5)
SODIUM SERPL-SCNC: 161 MMOL/L — CRITICAL HIGH (ref 135–145)
SODIUM SERPL-SCNC: 162 MMOL/L — CRITICAL HIGH (ref 135–145)
WBC # BLD: 14.2 K/UL — HIGH (ref 3.8–10.5)
WBC # FLD AUTO: 14.2 K/UL — HIGH (ref 3.8–10.5)

## 2018-03-26 RX ORDER — SODIUM CHLORIDE 9 MG/ML
1000 INJECTION, SOLUTION INTRAVENOUS
Qty: 0 | Refills: 0 | Status: DISCONTINUED | OUTPATIENT
Start: 2018-03-26 | End: 2018-03-27

## 2018-03-26 RX ORDER — LACTULOSE 10 G/15ML
30 SOLUTION ORAL ONCE
Qty: 0 | Refills: 0 | Status: COMPLETED | OUTPATIENT
Start: 2018-03-26 | End: 2018-03-26

## 2018-03-26 RX ORDER — SODIUM CHLORIDE 9 MG/ML
1000 INJECTION, SOLUTION INTRAVENOUS
Qty: 0 | Refills: 0 | Status: DISCONTINUED | OUTPATIENT
Start: 2018-03-26 | End: 2018-03-26

## 2018-03-26 RX ORDER — DOCUSATE SODIUM 100 MG
100 CAPSULE ORAL
Qty: 0 | Refills: 0 | Status: DISCONTINUED | OUTPATIENT
Start: 2018-03-26 | End: 2018-03-28

## 2018-03-26 RX ORDER — POLYETHYLENE GLYCOL 3350 17 G/17G
17 POWDER, FOR SOLUTION ORAL DAILY
Qty: 0 | Refills: 0 | Status: DISCONTINUED | OUTPATIENT
Start: 2018-03-26 | End: 2018-03-27

## 2018-03-26 RX ORDER — SENNA PLUS 8.6 MG/1
2 TABLET ORAL AT BEDTIME
Qty: 0 | Refills: 0 | Status: DISCONTINUED | OUTPATIENT
Start: 2018-03-26 | End: 2018-03-28

## 2018-03-26 RX ADMIN — Medication 3 MILLIGRAM(S): at 22:33

## 2018-03-26 RX ADMIN — Medication 325 MILLIGRAM(S): at 11:58

## 2018-03-26 RX ADMIN — LIDOCAINE 1 PATCH: 4 CREAM TOPICAL at 11:58

## 2018-03-26 RX ADMIN — HEPARIN SODIUM 5000 UNIT(S): 5000 INJECTION INTRAVENOUS; SUBCUTANEOUS at 06:03

## 2018-03-26 RX ADMIN — SODIUM CHLORIDE 50 MILLILITER(S): 9 INJECTION, SOLUTION INTRAVENOUS at 10:04

## 2018-03-26 RX ADMIN — LACTULOSE 30 GRAM(S): 10 SOLUTION ORAL at 18:08

## 2018-03-26 RX ADMIN — Medication 25 MILLIGRAM(S): at 06:02

## 2018-03-26 RX ADMIN — Medication 300 MILLIGRAM(S): at 11:58

## 2018-03-26 RX ADMIN — LIDOCAINE 1 PATCH: 4 CREAM TOPICAL at 23:58

## 2018-03-26 RX ADMIN — Medication 25 MILLIGRAM(S): at 17:56

## 2018-03-26 RX ADMIN — Medication 1 TABLET(S): at 06:03

## 2018-03-26 RX ADMIN — Medication 1 TABLET(S): at 17:56

## 2018-03-26 RX ADMIN — HEPARIN SODIUM 5000 UNIT(S): 5000 INJECTION INTRAVENOUS; SUBCUTANEOUS at 14:04

## 2018-03-26 RX ADMIN — POLYETHYLENE GLYCOL 3350 17 GRAM(S): 17 POWDER, FOR SOLUTION ORAL at 22:32

## 2018-03-26 RX ADMIN — HEPARIN SODIUM 5000 UNIT(S): 5000 INJECTION INTRAVENOUS; SUBCUTANEOUS at 22:32

## 2018-03-26 RX ADMIN — SENNA PLUS 2 TABLET(S): 8.6 TABLET ORAL at 22:33

## 2018-03-26 RX ADMIN — AMLODIPINE BESYLATE 5 MILLIGRAM(S): 2.5 TABLET ORAL at 06:02

## 2018-03-26 NOTE — PROGRESS NOTE ADULT - SUBJECTIVE AND OBJECTIVE BOX
PGY 1 Note discussed with supervising resident and primary attending    Patient is a 87y old  Male who presents with a chief complaint of syncopal episode today, found on floor of apartment (21 Mar 2018 03:03)      INTERVAL HPI/OVERNIGHT EVENTS: Patient seen and examined at bedside with no new complaints    MEDICATIONS  (STANDING):  allopurinol 300 milliGRAM(s) Oral daily  amLODIPine   Tablet 5 milliGRAM(s) Oral daily  amoxicillin  875 milliGRAM(s)/clavulanate 1 Tablet(s) Oral every 12 hours  dextrose 5%. 1000 milliLiter(s) (50 mL/Hr) IV Continuous <Continuous>  dextrose 50% Injectable 12.5 Gram(s) IV Push once  dextrose 50% Injectable 25 Gram(s) IV Push once  dextrose 50% Injectable 25 Gram(s) IV Push once  ferrous    sulfate 325 milliGRAM(s) Oral daily  heparin  Injectable 5000 Unit(s) SubCutaneous every 8 hours  insulin lispro (HumaLOG) corrective regimen sliding scale   SubCutaneous Before meals and at bedtime  lidocaine   Patch 1 Patch Transdermal daily  melatonin 3 milliGRAM(s) Oral at bedtime  metoprolol tartrate 25 milliGRAM(s) Oral two times a day    MEDICATIONS  (PRN):  acetaminophen   Tablet. 650 milliGRAM(s) Oral every 6 hours PRN Mild Pain (1 - 3)  dextrose Gel 1 Dose(s) Oral once PRN Blood Glucose LESS THAN 70 milliGRAM(s)/deciliter  glucagon  Injectable 1 milliGRAM(s) IntraMuscular once PRN Glucose LESS THAN 70 milligrams/deciliter  oxyCODONE    5 mG/acetaminophen 325 mG 1 Tablet(s) Oral every 4 hours PRN Moderate Pain (4 - 6)  traMADol 25 milliGRAM(s) Oral every 6 hours PRN Severe Pain (7 - 10)      __________________________________________________  REVIEW OF SYSTEMS:    CONSTITUTIONAL: No fever,   EYES: No acute visual disturbances  NECK: No pain or stiffness  RESPIRATORY: No cough; No shortness of breath  CARDIOVASCULAR: No chest pain, no palpitations  GASTROINTESTINAL: No pain. No nausea or vomiting; No diarrhea   NEUROLOGICAL: No headache or numbness, no tremors  MUSCULOSKELETAL: No joint pain, no muscle pain  GENITOURINARY: No dysuria, no frequency, no hesitancy  PSYCHIATRY: No depression , no anxiety  ALL OTHER  ROS negative        Vital Signs Last 24 Hrs  T(C): 36.6 (26 Mar 2018 05:31), Max: 36.9 (25 Mar 2018 21:20)  T(F): 97.8 (26 Mar 2018 05:31), Max: 98.5 (25 Mar 2018 21:20)  HR: 90 (26 Mar 2018 05:31) (79 - 101)  BP: 136/75 (26 Mar 2018 05:31) (109/54 - 140/68)  BP(mean): --  RR: 18 (26 Mar 2018 05:31) (17 - 18)  SpO2: 96% (26 Mar 2018 05:31) (94% - 97%)    ________________________________________________  PHYSICAL EXAM:  GENERAL: NAD  HEENT: Normocephalic;  conjunctivae and sclerae clear; moist mucous membranes;   NECK : supple  CHEST/LUNG: Clear to auscultation bilaterally with good air entry   HEART: S1 S2  regular; no murmurs, gallops or rubs  ABDOMEN: Soft, Nontender, Nondistended; Bowel sounds present  EXTREMITIES: No cyanosis; no edema; no calf tenderness  NERVOUS SYSTEM:  Awake and alert; Oriented  to place, person and time ; no new deficits    _________________________________________________  LABS:                        9.4    12.0  )-----------( 177      ( 25 Mar 2018 05:55 )             31.8     03-26    161<HH>  |  123<H>  |  35<H>  ----------------------------<  105<H>  3.3<L>   |  28  |  0.89    Ca    11.3<H>      26 Mar 2018 06:49  Phos  2.0     03-26  Mg     2.1     03-26          CAPILLARY BLOOD GLUCOSE      POCT Blood Glucose.: 118 mg/dL (25 Mar 2018 21:55)  POCT Blood Glucose.: 122 mg/dL (25 Mar 2018 16:42)  POCT Blood Glucose.: 140 mg/dL (25 Mar 2018 11:57)  POCT Blood Glucose.: 126 mg/dL (25 Mar 2018 08:01)        RADIOLOGY & ADDITIONAL TESTS:    Imaging Personally Reviewed:  YES    Consultant(s) Notes Reviewed:   YES    Care Discussed with Consultants : YES    Plan of care was discussed with patient and /or primary care giver; all questions and concerns were addressed and care was aligned with patient's wishes.

## 2018-03-26 NOTE — PROGRESS NOTE ADULT - SUBJECTIVE AND OBJECTIVE BOX
Patient is a 87y old  Male who presents with a chief complaint of syncopal episode today, found on floor of apartment (21 Mar 2018 03:03)      INTERVAL HPI/OVERNIGHT EVENTS:  Pt is awake, examined at bedside, in no acute distress.    VITAL SIGNS:  T(F): 97.7 (03-26-18 @ 17:50)  HR: 75 (03-26-18 @ 17:50)  BP: 146/68 (03-26-18 @ 17:50)  RR: 17 (03-26-18 @ 17:50)  SpO2: 99% (03-26-18 @ 17:50)  Wt(kg): --  I&O's Detail    26 Mar 2018 07:01  -  26 Mar 2018 18:51  --------------------------------------------------------  IN:    dextrose 5%.: 75 mL    dextrose 5%.: 300 mL    Oral Fluid: 118 mL  Total IN: 493 mL    OUT:  Total OUT: 0 mL    Total NET: 493 mL              REVIEW OF SYSTEMS:    CONSTITUTIONAL:  No fevers, chills, sweats    HEENT:  Eyes:  No diplopia or blurred vision. ENT:  No earache, sore throat or runny nose.    CARDIOVASCULAR:  No pressure, squeezing, tightness, or heaviness about the chest; no palpitations.    RESPIRATORY:  Per HPI    GASTROINTESTINAL:  No abdominal pain, nausea, vomiting or diarrhea.    GENITOURINARY:  No dysuria, frequency or urgency.    NEUROLOGIC:  No paresthesias, fasciculations, seizures or weakness.    PSYCHIATRIC:  No disorder of thought or mood.      PHYSICAL EXAM:    Constitutional: Well developed and nourished  Eyes:Perrla  ENMT: normal  Neck:supple  Respiratory: +slight b/l rales  Cardiovascular: S1 S2 regular  Gastrointestinal: Soft, Non tender  Extremities: No edema  Vascular:normal  Neurological:Awake, alert,Ox3  Musculoskeletal:Normal      MEDICATIONS  (STANDING):  allopurinol 300 milliGRAM(s) Oral daily  amLODIPine   Tablet 5 milliGRAM(s) Oral daily  amoxicillin  875 milliGRAM(s)/clavulanate 1 Tablet(s) Oral every 12 hours  dextrose 5%. 1000 milliLiter(s) (50 mL/Hr) IV Continuous <Continuous>  dextrose 5%. 1000 milliLiter(s) (75 mL/Hr) IV Continuous <Continuous>  dextrose 50% Injectable 12.5 Gram(s) IV Push once  dextrose 50% Injectable 25 Gram(s) IV Push once  dextrose 50% Injectable 25 Gram(s) IV Push once  docusate sodium 100 milliGRAM(s) Oral two times a day  ferrous    sulfate 325 milliGRAM(s) Oral daily  heparin  Injectable 5000 Unit(s) SubCutaneous every 8 hours  lidocaine   Patch 1 Patch Transdermal daily  melatonin 3 milliGRAM(s) Oral at bedtime  metoprolol tartrate 25 milliGRAM(s) Oral two times a day  polyethylene glycol 3350 17 Gram(s) Oral daily  senna 2 Tablet(s) Oral at bedtime    MEDICATIONS  (PRN):  acetaminophen   Tablet. 650 milliGRAM(s) Oral every 6 hours PRN Mild Pain (1 - 3)  dextrose Gel 1 Dose(s) Oral once PRN Blood Glucose LESS THAN 70 milliGRAM(s)/deciliter  glucagon  Injectable 1 milliGRAM(s) IntraMuscular once PRN Glucose LESS THAN 70 milligrams/deciliter  oxyCODONE    5 mG/acetaminophen 325 mG 1 Tablet(s) Oral every 4 hours PRN Moderate Pain (4 - 6)  traMADol 25 milliGRAM(s) Oral every 6 hours PRN Severe Pain (7 - 10)      Allergies    No Known Allergies    Intolerances        LABS:                        10.0   14.2  )-----------( 182      ( 26 Mar 2018 06:49 )             32.1     03-26    162<HH>  |  125<H>  |  40<H>  ----------------------------<  118<H>  3.3<L>   |  28  |  0.93    Ca    11.0<H>      26 Mar 2018 14:29  Phos  2.0     03-26  Mg     2.1     03-26                CAPILLARY BLOOD GLUCOSE      POCT Blood Glucose.: 123 mg/dL (26 Mar 2018 16:46)  POCT Blood Glucose.: 124 mg/dL (26 Mar 2018 11:50)  POCT Blood Glucose.: 130 mg/dL (26 Mar 2018 08:57)  POCT Blood Glucose.: 118 mg/dL (25 Mar 2018 21:55)    pro-bnp 19600 03-20 @ 21:17     d-dimer --  03-20 @ 21:17      RADIOLOGY & ADDITIONAL TESTS:    CXR:  < from: Xray Chest 1 View- PORTABLE-Routine (03.24.18 @ 11:21) >    IMPRESSION:  Bibasilar airspace disease, small bilateral pleural effusions.    Left perihilar 2 cm nodule, as noted on recent CT of the chest abdomen   and pelvis dated 3/22/2018, concerning for metastatic lesion versus lung   primary.    < end of copied text >    Ct scan chest:    ekg;    echo:

## 2018-03-26 NOTE — CHART NOTE - NSCHARTNOTEFT_GEN_A_CORE
Spoke with patient's niece Jennifer Holm over th phone. After she spoke with Dr. Bailey, she decided she does not want to pursue biopsy and she is interested in hospice. He would qualify for residential hospice at this time. SW consulted to help coordinate. D/W Dr. Ashford.

## 2018-03-26 NOTE — PROGRESS NOTE ADULT - PROBLEM SELECTOR PLAN 2
TTE EF 55%, mild pHTN, carotid doppler wnl, CE negative x 2, no acute events on telemetry  - No further w/u at this time

## 2018-03-27 DIAGNOSIS — R62.7 ADULT FAILURE TO THRIVE: ICD-10-CM

## 2018-03-27 DIAGNOSIS — R06.00 DYSPNEA, UNSPECIFIED: ICD-10-CM

## 2018-03-27 LAB
APPEARANCE UR: ABNORMAL
BASOPHILS # BLD AUTO: 0 K/UL — SIGNIFICANT CHANGE UP (ref 0–0.2)
BASOPHILS NFR BLD AUTO: 0.3 % — SIGNIFICANT CHANGE UP (ref 0–2)
BILIRUB UR-MCNC: ABNORMAL
BUN SERPL-MCNC: 38 MG/DL — HIGH (ref 7–18)
BUN SERPL-MCNC: 43 MG/DL — HIGH (ref 7–18)
CALCIUM SERPL-MCNC: 10.3 MG/DL — SIGNIFICANT CHANGE UP (ref 8.4–10.5)
CALCIUM SERPL-MCNC: 10.3 MG/DL — SIGNIFICANT CHANGE UP (ref 8.4–10.5)
CHLORIDE SERPL-SCNC: 128 MMOL/L — HIGH (ref 96–108)
CHLORIDE SERPL-SCNC: 129 MMOL/L — HIGH (ref 96–108)
CO2 SERPL-SCNC: 27 MMOL/L — SIGNIFICANT CHANGE UP (ref 22–31)
CO2 SERPL-SCNC: 27 MMOL/L — SIGNIFICANT CHANGE UP (ref 22–31)
COLOR SPEC: YELLOW — SIGNIFICANT CHANGE UP
CREAT SERPL-MCNC: 0.81 MG/DL — SIGNIFICANT CHANGE UP (ref 0.5–1.3)
CREAT SERPL-MCNC: 1.15 MG/DL — SIGNIFICANT CHANGE UP (ref 0.5–1.3)
DIFF PNL FLD: ABNORMAL
EOSINOPHIL # BLD AUTO: 0 K/UL — SIGNIFICANT CHANGE UP (ref 0–0.5)
EOSINOPHIL NFR BLD AUTO: 0.1 % — SIGNIFICANT CHANGE UP (ref 0–6)
GLUCOSE BLDC GLUCOMTR-MCNC: 121 MG/DL — HIGH (ref 70–99)
GLUCOSE BLDC GLUCOMTR-MCNC: 125 MG/DL — HIGH (ref 70–99)
GLUCOSE SERPL-MCNC: 120 MG/DL — HIGH (ref 70–99)
GLUCOSE SERPL-MCNC: 142 MG/DL — HIGH (ref 70–99)
GLUCOSE UR QL: NEGATIVE — SIGNIFICANT CHANGE UP
HCT VFR BLD CALC: 31 % — LOW (ref 39–50)
HGB BLD-MCNC: 9 G/DL — LOW (ref 13–17)
KETONES UR-MCNC: ABNORMAL
LEUKOCYTE ESTERASE UR-ACNC: ABNORMAL
LYMPHOCYTES # BLD AUTO: 0.6 K/UL — LOW (ref 1–3.3)
LYMPHOCYTES # BLD AUTO: 4.9 % — LOW (ref 13–44)
MCHC RBC-ENTMCNC: 26.8 PG — LOW (ref 27–34)
MCHC RBC-ENTMCNC: 29.2 GM/DL — LOW (ref 32–36)
MCV RBC AUTO: 91.8 FL — SIGNIFICANT CHANGE UP (ref 80–100)
MONOCYTES # BLD AUTO: 0.7 K/UL — SIGNIFICANT CHANGE UP (ref 0–0.9)
MONOCYTES NFR BLD AUTO: 5 % — SIGNIFICANT CHANGE UP (ref 2–14)
NEUTROPHILS # BLD AUTO: 11.6 K/UL — HIGH (ref 1.8–7.4)
NEUTROPHILS NFR BLD AUTO: 89.7 % — HIGH (ref 43–77)
NITRITE UR-MCNC: NEGATIVE — SIGNIFICANT CHANGE UP
PH UR: 5 — SIGNIFICANT CHANGE UP (ref 5–8)
PLATELET # BLD AUTO: 163 K/UL — SIGNIFICANT CHANGE UP (ref 150–400)
POTASSIUM SERPL-MCNC: 2.6 MMOL/L — SIGNIFICANT CHANGE UP (ref 3.5–5.3)
POTASSIUM SERPL-MCNC: 2.9 MMOL/L — CRITICAL LOW (ref 3.5–5.3)
POTASSIUM SERPL-SCNC: 2.6 MMOL/L — SIGNIFICANT CHANGE UP (ref 3.5–5.3)
POTASSIUM SERPL-SCNC: 2.9 MMOL/L — CRITICAL LOW (ref 3.5–5.3)
PROT UR-MCNC: 30 MG/DL
RBC # BLD: 3.38 M/UL — LOW (ref 4.2–5.8)
RBC # FLD: 17.9 % — HIGH (ref 10.3–14.5)
SODIUM SERPL-SCNC: 163 MMOL/L — CRITICAL HIGH (ref 135–145)
SODIUM SERPL-SCNC: 163 MMOL/L — SIGNIFICANT CHANGE UP (ref 135–145)
SP GR SPEC: 1.02 — SIGNIFICANT CHANGE UP (ref 1.01–1.02)
UROBILINOGEN FLD QL: 4
WBC # BLD: 13 K/UL — HIGH (ref 3.8–10.5)
WBC # FLD AUTO: 13 K/UL — HIGH (ref 3.8–10.5)

## 2018-03-27 PROCEDURE — 71045 X-RAY EXAM CHEST 1 VIEW: CPT | Mod: 26,77

## 2018-03-27 PROCEDURE — 71045 X-RAY EXAM CHEST 1 VIEW: CPT | Mod: 26

## 2018-03-27 PROCEDURE — 99233 SBSQ HOSP IP/OBS HIGH 50: CPT

## 2018-03-27 RX ORDER — ROBINUL 0.2 MG/ML
0.2 INJECTION INTRAMUSCULAR; INTRAVENOUS EVERY 6 HOURS
Qty: 0 | Refills: 0 | Status: DISCONTINUED | OUTPATIENT
Start: 2018-03-27 | End: 2018-03-28

## 2018-03-27 RX ORDER — MORPHINE SULFATE 50 MG/1
1 CAPSULE, EXTENDED RELEASE ORAL ONCE
Qty: 0 | Refills: 0 | Status: DISCONTINUED | OUTPATIENT
Start: 2018-03-27 | End: 2018-03-27

## 2018-03-27 RX ORDER — MORPHINE SULFATE 50 MG/1
2 CAPSULE, EXTENDED RELEASE ORAL
Qty: 0 | Refills: 0 | Status: DISCONTINUED | OUTPATIENT
Start: 2018-03-27 | End: 2018-03-28

## 2018-03-27 RX ORDER — DEXTROSE MONOHYDRATE, SODIUM CHLORIDE, AND POTASSIUM CHLORIDE 50; .745; 4.5 G/1000ML; G/1000ML; G/1000ML
1000 INJECTION, SOLUTION INTRAVENOUS
Qty: 0 | Refills: 0 | Status: DISCONTINUED | OUTPATIENT
Start: 2018-03-27 | End: 2018-03-28

## 2018-03-27 RX ORDER — MORPHINE SULFATE 50 MG/1
0.5 CAPSULE, EXTENDED RELEASE ORAL
Qty: 0 | Refills: 0 | Status: DISCONTINUED | OUTPATIENT
Start: 2018-03-27 | End: 2018-03-28

## 2018-03-27 RX ORDER — ACETAMINOPHEN 500 MG
650 TABLET ORAL ONCE
Qty: 0 | Refills: 0 | Status: COMPLETED | OUTPATIENT
Start: 2018-03-27 | End: 2018-03-27

## 2018-03-27 RX ORDER — POTASSIUM CHLORIDE 20 MEQ
10 PACKET (EA) ORAL ONCE
Qty: 0 | Refills: 0 | Status: COMPLETED | OUTPATIENT
Start: 2018-03-27 | End: 2018-03-27

## 2018-03-27 RX ORDER — CEFTRIAXONE 500 MG/1
INJECTION, POWDER, FOR SOLUTION INTRAMUSCULAR; INTRAVENOUS
Qty: 0 | Refills: 0 | Status: DISCONTINUED | OUTPATIENT
Start: 2018-03-27 | End: 2018-03-28

## 2018-03-27 RX ORDER — CEFTRIAXONE 500 MG/1
1 INJECTION, POWDER, FOR SOLUTION INTRAMUSCULAR; INTRAVENOUS EVERY 24 HOURS
Qty: 0 | Refills: 0 | Status: DISCONTINUED | OUTPATIENT
Start: 2018-03-28 | End: 2018-03-28

## 2018-03-27 RX ORDER — CEFTRIAXONE 500 MG/1
1 INJECTION, POWDER, FOR SOLUTION INTRAMUSCULAR; INTRAVENOUS ONCE
Qty: 0 | Refills: 0 | Status: COMPLETED | OUTPATIENT
Start: 2018-03-27 | End: 2018-03-27

## 2018-03-27 RX ADMIN — HEPARIN SODIUM 5000 UNIT(S): 5000 INJECTION INTRAVENOUS; SUBCUTANEOUS at 13:06

## 2018-03-27 RX ADMIN — Medication 1 TABLET(S): at 05:28

## 2018-03-27 RX ADMIN — Medication 100 MILLIGRAM(S): at 07:51

## 2018-03-27 RX ADMIN — Medication 650 MILLIGRAM(S): at 16:30

## 2018-03-27 RX ADMIN — Medication 25 MILLIGRAM(S): at 05:28

## 2018-03-27 RX ADMIN — HEPARIN SODIUM 5000 UNIT(S): 5000 INJECTION INTRAVENOUS; SUBCUTANEOUS at 21:55

## 2018-03-27 RX ADMIN — MORPHINE SULFATE 0.5 MILLIGRAM(S): 50 CAPSULE, EXTENDED RELEASE ORAL at 14:29

## 2018-03-27 RX ADMIN — Medication 325 MILLIGRAM(S): at 12:50

## 2018-03-27 RX ADMIN — MORPHINE SULFATE 0.5 MILLIGRAM(S): 50 CAPSULE, EXTENDED RELEASE ORAL at 14:45

## 2018-03-27 RX ADMIN — CEFTRIAXONE 100 GRAM(S): 500 INJECTION, POWDER, FOR SOLUTION INTRAMUSCULAR; INTRAVENOUS at 19:38

## 2018-03-27 RX ADMIN — DEXTROSE MONOHYDRATE, SODIUM CHLORIDE, AND POTASSIUM CHLORIDE 100 MILLILITER(S): 50; .745; 4.5 INJECTION, SOLUTION INTRAVENOUS at 10:53

## 2018-03-27 RX ADMIN — MORPHINE SULFATE 1 MILLIGRAM(S): 50 CAPSULE, EXTENDED RELEASE ORAL at 19:39

## 2018-03-27 RX ADMIN — Medication 300 MILLIGRAM(S): at 12:50

## 2018-03-27 RX ADMIN — AMLODIPINE BESYLATE 5 MILLIGRAM(S): 2.5 TABLET ORAL at 05:28

## 2018-03-27 RX ADMIN — HEPARIN SODIUM 5000 UNIT(S): 5000 INJECTION INTRAVENOUS; SUBCUTANEOUS at 05:28

## 2018-03-27 RX ADMIN — Medication 100 MILLIEQUIVALENT(S): at 16:57

## 2018-03-27 RX ADMIN — MORPHINE SULFATE 1 MILLIGRAM(S): 50 CAPSULE, EXTENDED RELEASE ORAL at 19:23

## 2018-03-27 RX ADMIN — Medication 100 MILLIEQUIVALENT(S): at 10:53

## 2018-03-27 RX ADMIN — LIDOCAINE 1 PATCH: 4 CREAM TOPICAL at 12:50

## 2018-03-27 NOTE — DIETITIAN INITIAL EVALUATION ADULT. - NS AS NUTRI INTERV COLLABORAT
Referral to other providers/Discussed with MD/RN; Swallow evaluation as medically feasible/Collaboration with other providers

## 2018-03-27 NOTE — PROGRESS NOTE ADULT - PROBLEM SELECTOR PLAN 4
Patient significantly more symptomatic today; spoke with patient's niece Jennifer Holm  (cell [phone]) over the phone. She is flying in from [place] today and will be here tomorrow. She is agreeable for inpatient hospice for symptom management and she understands his poor prognosis. Dr. Ashford aware, SW aware.

## 2018-03-27 NOTE — CHART NOTE - NSCHARTNOTEFT_GEN_A_CORE
Upon Nutritional Assessment by the Registered Dietitian your patient was determined to meet criteria / has evidence of the following diagnosis/diagnoses:          [ ]  Mild Protein Calorie Malnutrition        [ ]  Moderate Protein Calorie Malnutrition        [ X ] Severe Protein Calorie Malnutrition        [ ] Unspecified Protein Calorie Malnutrition        [ ] Underweight / BMI <19        [ ] Morbid Obesity / BMI > 40      Findings as based on:  •  Comprehensive nutrition assessment and consultation  •  Calorie counts (nutrient intake analysis)  •  Food acceptance and intake status from observations by staff  •  Follow up  •  Patient education  •  Intervention secondary to interdisciplinary rounds  •   concerns      Treatment:    The following diet has been recommended: Change diet to dysphagia puree honey thicken liquid, Add Ensure Pudding 120ml x tid (510kcal 12g protein) if medically feasible; Swallow evaluation as medically feasible       PROVIDER Section:     By signing this assessment you are acknowledging and agree with the diagnosis/diagnoses assigned by the Registered Dietitian    Comments:

## 2018-03-27 NOTE — PROGRESS NOTE ADULT - SUBJECTIVE AND OBJECTIVE BOX
OVERNIGHT EVENTS: Increasing lethargy, dyspnea     Review of Systems: Unable to obtain due to poor mentation    MEDICATIONS  (STANDING):  allopurinol 300 milliGRAM(s) Oral daily  amLODIPine   Tablet 5 milliGRAM(s) Oral daily  amoxicillin  875 milliGRAM(s)/clavulanate 1 Tablet(s) Oral every 12 hours  dextrose 5% with potassium chloride 20 mEq/L 1000 milliLiter(s) (100 mL/Hr) IV Continuous <Continuous>  dextrose 50% Injectable 12.5 Gram(s) IV Push once  dextrose 50% Injectable 25 Gram(s) IV Push once  dextrose 50% Injectable 25 Gram(s) IV Push once  docusate sodium 100 milliGRAM(s) Oral two times a day  heparin  Injectable 5000 Unit(s) SubCutaneous every 8 hours  lidocaine   Patch 1 Patch Transdermal daily  melatonin 3 milliGRAM(s) Oral at bedtime  metoprolol tartrate 25 milliGRAM(s) Oral two times a day  senna 2 Tablet(s) Oral at bedtime    MEDICATIONS  (PRN):  dextrose Gel 1 Dose(s) Oral once PRN Blood Glucose LESS THAN 70 milliGRAM(s)/deciliter  glucagon  Injectable 1 milliGRAM(s) IntraMuscular once PRN Glucose LESS THAN 70 milligrams/deciliter  glycopyrrolate Injectable 0.2 milliGRAM(s) IV Push every 6 hours PRN Secretions  morphine  - Injectable 0.5 milliGRAM(s) IV Push every 1 hour PRN Dyspnea  morphine  - Injectable 2 milliGRAM(s) IV Push every 2 hours PRN Severe Pain (7 - 10)  traMADol 25 milliGRAM(s) Oral every 6 hours PRN Severe Pain (7 - 10)    PHYSICAL EXAM:  Vital Signs Last 24 Hrs  T(C): 38.7 (27 Mar 2018 15:38), Max: 38.7 (27 Mar 2018 15:38)  T(F): 101.7 (27 Mar 2018 15:38), Max: 101.7 (27 Mar 2018 15:38)  HR: 101 (27 Mar 2018 15:10) (75 - 101)  BP: 94/46 (27 Mar 2018 15:10) (94/46 - 146/68)  RR: 34 (27 Mar 2018 15:10) (17 - 34)  SpO2: 94% (27 Mar 2018 15:10) (90% - 99%)    General: alert  oriented x _0___    lethargic, distressed, nonverbal    Karnofsky Performance Score/Palliative Performance Status Version2: 20%    HEENT: dry mouth   Lungs: tachypnea/labored breathing,  excessive secretions  CV:  tachycardia  GI:  incontinent  : normal  incontinent  oliguria/anuria  carter  Musculoskeletal: normal  weakness  edema             ambulatory  bedbound/wheelchair bound  Skin: normal  pressure ulcers: stage: edema: other:  Neuro: no deficits cognitive impairment dsyphagia/dysarthria paresis: other:  Oral intake ability: unable/only mouth care [minimal moderate full capability]  Diet: NPO,     LABS:                          9.0    13.0  )-----------( 163      ( 27 Mar 2018 06:11 )             31.0     03-27    163<HH>  |  128<H>  |  43<H>  ----------------------------<  142<H>  2.9<LL>   |  27  |  1.15    Ca    10.3      27 Mar 2018 14:36  Phos  2.0     03-26  Mg     2.1     03-26          RADIOLOGY & ADDITIONAL STUDIES:    ADVANCE DIRECTIVES:  Advanced Care Planning discussion total time spent: OVERNIGHT EVENTS: Increasing lethargy, dyspnea, agitation     Review of Systems: Unable to obtain due to poor mentation    MEDICATIONS  (STANDING):  allopurinol 300 milliGRAM(s) Oral daily  amLODIPine   Tablet 5 milliGRAM(s) Oral daily  amoxicillin  875 milliGRAM(s)/clavulanate 1 Tablet(s) Oral every 12 hours  dextrose 5% with potassium chloride 20 mEq/L 1000 milliLiter(s) (100 mL/Hr) IV Continuous <Continuous>  dextrose 50% Injectable 12.5 Gram(s) IV Push once  dextrose 50% Injectable 25 Gram(s) IV Push once  dextrose 50% Injectable 25 Gram(s) IV Push once  docusate sodium 100 milliGRAM(s) Oral two times a day  heparin  Injectable 5000 Unit(s) SubCutaneous every 8 hours  lidocaine   Patch 1 Patch Transdermal daily  melatonin 3 milliGRAM(s) Oral at bedtime  metoprolol tartrate 25 milliGRAM(s) Oral two times a day  senna 2 Tablet(s) Oral at bedtime    MEDICATIONS  (PRN):  dextrose Gel 1 Dose(s) Oral once PRN Blood Glucose LESS THAN 70 milliGRAM(s)/deciliter  glucagon  Injectable 1 milliGRAM(s) IntraMuscular once PRN Glucose LESS THAN 70 milligrams/deciliter  glycopyrrolate Injectable 0.2 milliGRAM(s) IV Push every 6 hours PRN Secretions  morphine  - Injectable 0.5 milliGRAM(s) IV Push every 1 hour PRN Dyspnea  morphine  - Injectable 2 milliGRAM(s) IV Push every 2 hours PRN Severe Pain (7 - 10)  traMADol 25 milliGRAM(s) Oral every 6 hours PRN Severe Pain (7 - 10)    PHYSICAL EXAM:  Vital Signs Last 24 Hrs  T(C): 38.7 (27 Mar 2018 15:38), Max: 38.7 (27 Mar 2018 15:38)  T(F): 101.7 (27 Mar 2018 15:38), Max: 101.7 (27 Mar 2018 15:38)  HR: 101 (27 Mar 2018 15:10) (75 - 101)  BP: 94/46 (27 Mar 2018 15:10) (94/46 - 146/68)  RR: 34 (27 Mar 2018 15:10) (17 - 34)  SpO2: 94% (27 Mar 2018 15:10) (90% - 99%)    General: alert  oriented x _0___    lethargic, distressed, nonverbal    Karnofsky Performance Score/Palliative Performance Status Version2: 20%    HEENT: dry mouth   Lungs: tachypnea/labored breathing,  excessive secretions  CV:  tachycardia  GI:  Incontinent  : Incontinent    Musculoskeletal: Weakness, bedbound/wheelchair bound  Skin: Stage II sacrum  Neuro:  cognitive impairment   Oral intake ability: Minimal  Diet: Pureed    LABS:                          9.0    13.0  )-----------( 163      ( 27 Mar 2018 06:11 )             31.0     03-27    163<HH>  |  128<H>  |  43<H>  ----------------------------<  142<H>  2.9<LL>   |  27  |  1.15    Ca    10.3      27 Mar 2018 14:36  Phos  2.0     03-26  Mg     2.1     03-26    ADVANCE DIRECTIVES: DNR/DNI

## 2018-03-27 NOTE — PROGRESS NOTE ADULT - SUBJECTIVE AND OBJECTIVE BOX
PGY 1 Note discussed with supervising resident and primary attending    Patient is a 87y old  Male who presents with a chief complaint of syncopal episode today, found on floor of apartment (21 Mar 2018 03:03)      INTERVAL HPI/OVERNIGHT EVENTS: Patient seen and examined at bedside with no new complaints    MEDICATIONS  (STANDING):  allopurinol 300 milliGRAM(s) Oral daily  amLODIPine   Tablet 5 milliGRAM(s) Oral daily  amoxicillin  875 milliGRAM(s)/clavulanate 1 Tablet(s) Oral every 12 hours  dextrose 5% with potassium chloride 20 mEq/L 1000 milliLiter(s) (100 mL/Hr) IV Continuous <Continuous>  dextrose 5%. 1000 milliLiter(s) (75 mL/Hr) IV Continuous <Continuous>  dextrose 5%. 1000 milliLiter(s) (50 mL/Hr) IV Continuous <Continuous>  dextrose 50% Injectable 12.5 Gram(s) IV Push once  dextrose 50% Injectable 25 Gram(s) IV Push once  dextrose 50% Injectable 25 Gram(s) IV Push once  docusate sodium 100 milliGRAM(s) Oral two times a day  ferrous    sulfate 325 milliGRAM(s) Oral daily  heparin  Injectable 5000 Unit(s) SubCutaneous every 8 hours  lidocaine   Patch 1 Patch Transdermal daily  melatonin 3 milliGRAM(s) Oral at bedtime  metoprolol tartrate 25 milliGRAM(s) Oral two times a day  polyethylene glycol 3350 17 Gram(s) Oral daily  potassium chloride  10 mEq/100 mL IVPB 10 milliEquivalent(s) IV Intermittent once  senna 2 Tablet(s) Oral at bedtime    MEDICATIONS  (PRN):  acetaminophen   Tablet. 650 milliGRAM(s) Oral every 6 hours PRN Mild Pain (1 - 3)  dextrose Gel 1 Dose(s) Oral once PRN Blood Glucose LESS THAN 70 milliGRAM(s)/deciliter  glucagon  Injectable 1 milliGRAM(s) IntraMuscular once PRN Glucose LESS THAN 70 milligrams/deciliter  oxyCODONE    5 mG/acetaminophen 325 mG 1 Tablet(s) Oral every 4 hours PRN Moderate Pain (4 - 6)  traMADol 25 milliGRAM(s) Oral every 6 hours PRN Severe Pain (7 - 10)      __________________________________________________  REVIEW OF SYSTEMS:    CONSTITUTIONAL: No fever,   EYES: No acute visual disturbances  NECK: No pain or stiffness  RESPIRATORY: No cough; No shortness of breath  CARDIOVASCULAR: No chest pain, no palpitations  GASTROINTESTINAL: No pain. No nausea or vomiting; No diarrhea   NEUROLOGICAL: No headache or numbness, no tremors  MUSCULOSKELETAL: No joint pain, no muscle pain  GENITOURINARY: No dysuria, no frequency, no hesitancy  PSYCHIATRY: No depression , no anxiety  ALL OTHER  ROS negative        Vital Signs Last 24 Hrs  T(C): 36.7 (27 Mar 2018 05:57), Max: 36.7 (26 Mar 2018 13:59)  T(F): 98.1 (27 Mar 2018 05:57), Max: 98.1 (27 Mar 2018 05:57)  HR: 84 (27 Mar 2018 05:57) (70 - 93)  BP: 133/56 (27 Mar 2018 05:57) (99/72 - 146/68)  BP(mean): --  RR: 18 (27 Mar 2018 05:57) (17 - 19)  SpO2: 95% (27 Mar 2018 05:57) (95% - 99%)    ________________________________________________  PHYSICAL EXAM:  GENERAL: NAD  HEENT: Normocephalic;  conjunctivae and sclerae clear; moist mucous membranes;   NECK : supple  CHEST/LUNG: Clear to auscultation bilaterally with good air entry   HEART: S1 S2  regular; no murmurs, gallops or rubs  ABDOMEN: Soft, Nontender, Nondistended; Bowel sounds present  EXTREMITIES: No cyanosis; no edema; no calf tenderness  NERVOUS SYSTEM:  Awake and alert; Oriented  to place, person and time ; no new deficits    _________________________________________________  LABS:                        9.0    13.0  )-----------( 163      ( 27 Mar 2018 06:11 )             31.0     03-27    163  |  129<H>  |  38<H>  ----------------------------<  120<H>  2.6   |  27  |  0.81    Ca    10.3      27 Mar 2018 06:11  Phos  2.0     03-26  Mg     2.1     03-26          CAPILLARY BLOOD GLUCOSE      POCT Blood Glucose.: 137 mg/dL (26 Mar 2018 21:49)  POCT Blood Glucose.: 123 mg/dL (26 Mar 2018 16:46)  POCT Blood Glucose.: 124 mg/dL (26 Mar 2018 11:50)  POCT Blood Glucose.: 130 mg/dL (26 Mar 2018 08:57)        RADIOLOGY & ADDITIONAL TESTS:    Imaging Personally Reviewed:  YES    Consultant(s) Notes Reviewed:   YES    Care Discussed with Consultants : YES    Plan of care was discussed with patient and /or primary care giver; all questions and concerns were addressed and care was aligned with patient's wishes.

## 2018-03-27 NOTE — DIETITIAN INITIAL EVALUATION ADULT. - SIGNS/SYMPTOMS
poor oral intake, wt loss, debility, pressure ulcers poor oral intake, wt loss, debility, Failure to Thrive per MD, pressure ulcers

## 2018-03-27 NOTE — PROGRESS NOTE ADULT - PROBLEM SELECTOR PLAN 2
Consulted Pulmonary Dr Storm, Heme Onc Dr Bailey, and Palliative Brooke NP   ***Imaging showed large LLL mass, right clavicle mass, bone lesion, multiple mediastinal and hilar nodes; Likely Stage 4 Lung CA  - D/w HCP Jennif[facility]; decided for hospice, wants [facility]

## 2018-03-27 NOTE — DIETITIAN INITIAL EVALUATION ADULT. - FACTORS AFF FOOD INTAKE
persistent lack of appetite/difficulty feeding self/difficulty swallowing/difficulty with food procurement/preparation

## 2018-03-27 NOTE — DIETITIAN INITIAL EVALUATION ADULT. - OTHER INFO
nutrition assessment for length of stay; lives home alone PTA; pressure ulcer stage II x 3; poor appetite, coughing at times with liquids per RN, no GI distress reported at present; unsure recent wt changes, wt data from [facility] EMR: 178.3 l b3/22/18-->152.1 lb 3/27/18, wt loss noted, but quantity questionable; lung malignancy with metastasis, for hospice per Palliative consult

## 2018-03-27 NOTE — PROGRESS NOTE ADULT - SUBJECTIVE AND OBJECTIVE BOX
Patient is a 87y old  Male who presents with a chief complaint of syncopal episode today, found on floor of apartment (21 Mar 2018 03:03)      INTERVAL HPI/OVERNIGHT EVENTS:  Pt was seen and examined at bedside, has no new complaints.     VITAL SIGNS:  T(F): 101.7 (18 @ 15:38)  HR: 101 (18 @ 15:10)  BP: 94/46 (18 @ 15:10)  RR: 34 (18 @ 15:10)  SpO2: 94% (18 @ 15:10)  Wt(kg): --  I&O's Detail    26 Mar 2018 07:01  -  27 Mar 2018 07:00  --------------------------------------------------------  IN:    dextrose 5%.: 75 mL    dextrose 5%.: 300 mL    dextrose 5%.: 150 mL    Oral Fluid: 118 mL  Total IN: 643 mL    OUT:    Stool: 2 mL  Total OUT: 2 mL    Total NET: 641 mL      27 Mar 2018 07:01  -  27 Mar 2018 18:04  --------------------------------------------------------  IN:  Total IN: 0 mL    OUT:  Total OUT: 0 mL    Total NET: 0 mL              REVIEW OF SYSTEMS:    CONSTITUTIONAL:  No fevers, chills, sweats    HEENT:  Eyes:  No diplopia or blurred vision. ENT:  No earache, sore throat or runny nose.    CARDIOVASCULAR:  No pressure, squeezing, tightness, or heaviness about the chest; no palpitations.    RESPIRATORY:  Per HPI    GASTROINTESTINAL:  No abdominal pain, nausea, vomiting or diarrhea.    GENITOURINARY:  No dysuria, frequency or urgency.    NEUROLOGIC:  No paresthesias, fasciculations, seizures or weakness.    PSYCHIATRIC:  No disorder of thought or mood.      PHYSICAL EXAM:    Constitutional: NAD  Eyes:Perrla  ENMT: normal  Neck:supple  Respiratory: good air entry  Cardiovascular: S1 S2 regular  Gastrointestinal: Soft, Non tender  Extremities: No edema  Vascular:normal  Neurological:Awake, alert,Ox3  Musculoskeletal:Normal      MEDICATIONS  (STANDING):  allopurinol 300 milliGRAM(s) Oral daily  amLODIPine   Tablet 5 milliGRAM(s) Oral daily  amoxicillin  875 milliGRAM(s)/clavulanate 1 Tablet(s) Oral every 12 hours  dextrose 5% with potassium chloride 20 mEq/L 1000 milliLiter(s) (100 mL/Hr) IV Continuous <Continuous>  dextrose 50% Injectable 12.5 Gram(s) IV Push once  dextrose 50% Injectable 25 Gram(s) IV Push once  dextrose 50% Injectable 25 Gram(s) IV Push once  docusate sodium 100 milliGRAM(s) Oral two times a day  heparin  Injectable 5000 Unit(s) SubCutaneous every 8 hours  lidocaine   Patch 1 Patch Transdermal daily  melatonin 3 milliGRAM(s) Oral at bedtime  metoprolol tartrate 25 milliGRAM(s) Oral two times a day  senna 2 Tablet(s) Oral at bedtime    MEDICATIONS  (PRN):  dextrose Gel 1 Dose(s) Oral once PRN Blood Glucose LESS THAN 70 milliGRAM(s)/deciliter  glucagon  Injectable 1 milliGRAM(s) IntraMuscular once PRN Glucose LESS THAN 70 milligrams/deciliter  glycopyrrolate Injectable 0.2 milliGRAM(s) IV Push every 6 hours PRN Secretions  LORazepam   Injectable 0.5 milliGRAM(s) IV Push every 4 hours PRN Agitation  morphine  - Injectable 0.5 milliGRAM(s) IV Push every 1 hour PRN Dyspnea  morphine  - Injectable 2 milliGRAM(s) IV Push every 2 hours PRN Severe Pain (7 - 10)  traMADol 25 milliGRAM(s) Oral every 6 hours PRN Severe Pain (7 - 10)      Allergies    No Known Allergies    Intolerances        LABS:                        9.0    13.0  )-----------( 163      ( 27 Mar 2018 06:11 )             31.0     03-    163<HH>  |  128<H>  |  43<H>  ----------------------------<  142<H>  2.9<LL>   |  27  |  1.15    Ca    10.3      27 Mar 2018 14:36  Phos  2.0     03-26  Mg     2.1             Urinalysis Basic - ( 27 Mar 2018 16:59 )    Color: Yellow / Appearance: Slightly Turbid / S.020 / pH: x  Gluc: x / Ketone: Trace  / Bili: Small / Urobili: 4   Blood: x / Protein: 30 mg/dL / Nitrite: Negative   Leuk Esterase: Trace / RBC: 2-5 /HPF / WBC 6-10 /HPF   Sq Epi: x / Non Sq Epi: Moderate /HPF / Bacteria: Moderate /HPF            CAPILLARY BLOOD GLUCOSE      POCT Blood Glucose.: 125 mg/dL (27 Mar 2018 12:50)  POCT Blood Glucose.: 121 mg/dL (27 Mar 2018 08:41)  POCT Blood Glucose.: 137 mg/dL (26 Mar 2018 21:49)    pro-bnp  @ 21:17     d-dimer --   @ 21:17      RADIOLOGY & ADDITIONAL TESTS:    CXR:  < from: Xray Chest 1 View- PORTABLE-Urgent (18 @ 13:08) >  Impression: Essentially stable exam.    < end of copied text >    Ct scan chest:    ekg;    echo: Patient is a 87y old  Male who presents with a chief complaint of syncopal episode today, found on floor of apartment (21 Mar 2018 03:03)      INTERVAL HPI/OVERNIGHT EVENTS:  Pt was seen and examined at bedside, has no new complaints. Lethargic    VITAL SIGNS:  T(F): 101.7 (18 @ 15:38)  HR: 101 (18 @ 15:10)  BP: 94/46 (18 @ 15:10)  RR: 34 (18 @ 15:10)  SpO2: 94% (18 @ 15:10)  Wt(kg): --  I&O's Detail    26 Mar 2018 07:01  -  27 Mar 2018 07:00  --------------------------------------------------------  IN:    dextrose 5%.: 75 mL    dextrose 5%.: 300 mL    dextrose 5%.: 150 mL    Oral Fluid: 118 mL  Total IN: 643 mL    OUT:    Stool: 2 mL  Total OUT: 2 mL    Total NET: 641 mL      27 Mar 2018 07:01  -  27 Mar 2018 18:04  --------------------------------------------------------  IN:  Total IN: 0 mL    OUT:  Total OUT: 0 mL    Total NET: 0 mL              REVIEW OF SYSTEMS:    CONSTITUTIONAL:  No fevers, chills, sweats    HEENT:  Eyes:  No diplopia or blurred vision. ENT:  No earache, sore throat or runny nose.    CARDIOVASCULAR:  No pressure, squeezing, tightness, or heaviness about the chest; no palpitations.    RESPIRATORY:  Per HPI    GASTROINTESTINAL:  No abdominal pain, nausea, vomiting or diarrhea.    GENITOURINARY:  No dysuria, frequency or urgency.    NEUROLOGIC:  No paresthesias, fasciculations, seizures or weakness.    PSYCHIATRIC:  No disorder of thought or mood.      PHYSICAL EXAM:    Constitutional: NAD  Eyes:Perrla  ENMT: normal  Neck:supple  Respiratory: good air entry  Cardiovascular: S1 S2 regular  Gastrointestinal: Soft, Non tender  Extremities: No edema  Vascular:normal  Neurological:Awake, alert,Ox3  Musculoskeletal:Normal      MEDICATIONS  (STANDING):  allopurinol 300 milliGRAM(s) Oral daily  amLODIPine   Tablet 5 milliGRAM(s) Oral daily  amoxicillin  875 milliGRAM(s)/clavulanate 1 Tablet(s) Oral every 12 hours  dextrose 5% with potassium chloride 20 mEq/L 1000 milliLiter(s) (100 mL/Hr) IV Continuous <Continuous>  dextrose 50% Injectable 12.5 Gram(s) IV Push once  dextrose 50% Injectable 25 Gram(s) IV Push once  dextrose 50% Injectable 25 Gram(s) IV Push once  docusate sodium 100 milliGRAM(s) Oral two times a day  heparin  Injectable 5000 Unit(s) SubCutaneous every 8 hours  lidocaine   Patch 1 Patch Transdermal daily  melatonin 3 milliGRAM(s) Oral at bedtime  metoprolol tartrate 25 milliGRAM(s) Oral two times a day  senna 2 Tablet(s) Oral at bedtime    MEDICATIONS  (PRN):  dextrose Gel 1 Dose(s) Oral once PRN Blood Glucose LESS THAN 70 milliGRAM(s)/deciliter  glucagon  Injectable 1 milliGRAM(s) IntraMuscular once PRN Glucose LESS THAN 70 milligrams/deciliter  glycopyrrolate Injectable 0.2 milliGRAM(s) IV Push every 6 hours PRN Secretions  LORazepam   Injectable 0.5 milliGRAM(s) IV Push every 4 hours PRN Agitation  morphine  - Injectable 0.5 milliGRAM(s) IV Push every 1 hour PRN Dyspnea  morphine  - Injectable 2 milliGRAM(s) IV Push every 2 hours PRN Severe Pain (7 - 10)  traMADol 25 milliGRAM(s) Oral every 6 hours PRN Severe Pain (7 - 10)      Allergies    No Known Allergies    Intolerances        LABS:                        9.0    13.0  )-----------( 163      ( 27 Mar 2018 06:11 )             31.0     03-    163<HH>  |  128<H>  |  43<H>  ----------------------------<  142<H>  2.9<LL>   |  27  |  1.15    Ca    10.3      27 Mar 2018 14:36  Phos  2.0     03-26  Mg     2.1     03-26        Urinalysis Basic - ( 27 Mar 2018 16:59 )    Color: Yellow / Appearance: Slightly Turbid / S.020 / pH: x  Gluc: x / Ketone: Trace  / Bili: Small / Urobili: 4   Blood: x / Protein: 30 mg/dL / Nitrite: Negative   Leuk Esterase: Trace / RBC: 2-5 /HPF / WBC 6-10 /HPF   Sq Epi: x / Non Sq Epi: Moderate /HPF / Bacteria: Moderate /HPF            CAPILLARY BLOOD GLUCOSE      POCT Blood Glucose.: 125 mg/dL (27 Mar 2018 12:50)  POCT Blood Glucose.: 121 mg/dL (27 Mar 2018 08:41)  POCT Blood Glucose.: 137 mg/dL (26 Mar 2018 21:49)    pro-bnp  @ 21:17     d-dimer --   @ 21:17      RADIOLOGY & ADDITIONAL TESTS:    CXR:  < from: Xray Chest 1 View- PORTABLE-Urgent (18 @ 13:08) >  Impression: Essentially stable exam.    < end of copied text >    Ct scan chest:    ekg;    echo:

## 2018-03-27 NOTE — DIETITIAN INITIAL EVALUATION ADULT. - NUTRITION INTERVENTION
Feeding Assistance/Meals and Snack/Collaboration and Referral of Nutrition Care/Medical Food Supplements

## 2018-03-27 NOTE — PROGRESS NOTE ADULT - PROBLEM SELECTOR PLAN 10
IMPROVE VTE Individual Risk Assessment        RISK                                                          Points   [ x ] Immobilization > 24 hrs                              1  [ x ] Age > 60                                                         1  IMPROVE VTE Score: 2, C/w Lovenox for VTE prophylaxis  - GI PPx not indicated

## 2018-03-28 ENCOUNTER — TRANSCRIPTION ENCOUNTER (OUTPATIENT)
Age: 83
End: 2018-03-28

## 2018-03-28 LAB
ANION GAP SERPL CALC-SCNC: 9 MMOL/L — SIGNIFICANT CHANGE UP (ref 5–17)
BASOPHILS # BLD AUTO: 0 K/UL — SIGNIFICANT CHANGE UP (ref 0–0.2)
BASOPHILS NFR BLD AUTO: 0.4 % — SIGNIFICANT CHANGE UP (ref 0–2)
BUN SERPL-MCNC: 48 MG/DL — HIGH (ref 7–18)
CALCIUM SERPL-MCNC: 9.6 MG/DL — SIGNIFICANT CHANGE UP (ref 8.4–10.5)
CHLORIDE SERPL-SCNC: 127 MMOL/L — HIGH (ref 96–108)
CO2 SERPL-SCNC: 28 MMOL/L — SIGNIFICANT CHANGE UP (ref 22–31)
CREAT SERPL-MCNC: 1.21 MG/DL — SIGNIFICANT CHANGE UP (ref 0.5–1.3)
CULTURE RESULTS: NO GROWTH — SIGNIFICANT CHANGE UP
EOSINOPHIL # BLD AUTO: 0 K/UL — SIGNIFICANT CHANGE UP (ref 0–0.5)
EOSINOPHIL NFR BLD AUTO: 0.2 % — SIGNIFICANT CHANGE UP (ref 0–6)
GLUCOSE BLDC GLUCOMTR-MCNC: 116 MG/DL — HIGH (ref 70–99)
GLUCOSE BLDC GLUCOMTR-MCNC: 139 MG/DL — HIGH (ref 70–99)
GLUCOSE BLDC GLUCOMTR-MCNC: 141 MG/DL — HIGH (ref 70–99)
GLUCOSE BLDC GLUCOMTR-MCNC: 147 MG/DL — HIGH (ref 70–99)
GLUCOSE SERPL-MCNC: 118 MG/DL — HIGH (ref 70–99)
HCT VFR BLD CALC: 28.2 % — LOW (ref 39–50)
HGB BLD-MCNC: 8.9 G/DL — LOW (ref 13–17)
LYMPHOCYTES # BLD AUTO: 0.7 K/UL — LOW (ref 1–3.3)
LYMPHOCYTES # BLD AUTO: 6.4 % — LOW (ref 13–44)
MAGNESIUM SERPL-MCNC: 2.1 MG/DL — SIGNIFICANT CHANGE UP (ref 1.6–2.6)
MCHC RBC-ENTMCNC: 28.8 PG — SIGNIFICANT CHANGE UP (ref 27–34)
MCHC RBC-ENTMCNC: 31.5 GM/DL — LOW (ref 32–36)
MCV RBC AUTO: 91.3 FL — SIGNIFICANT CHANGE UP (ref 80–100)
MONOCYTES # BLD AUTO: 0.6 K/UL — SIGNIFICANT CHANGE UP (ref 0–0.9)
MONOCYTES NFR BLD AUTO: 5 % — SIGNIFICANT CHANGE UP (ref 2–14)
NEUTROPHILS # BLD AUTO: 10.1 K/UL — HIGH (ref 1.8–7.4)
NEUTROPHILS NFR BLD AUTO: 88 % — HIGH (ref 43–77)
PLATELET # BLD AUTO: 140 K/UL — LOW (ref 150–400)
POTASSIUM SERPL-MCNC: 3.4 MMOL/L — LOW (ref 3.5–5.3)
POTASSIUM SERPL-SCNC: 3.4 MMOL/L — LOW (ref 3.5–5.3)
PTH RELATED PROT SERPL-MCNC: 2.7 PMOL/L — SIGNIFICANT CHANGE UP
RBC # BLD: 3.09 M/UL — LOW (ref 4.2–5.8)
RBC # FLD: 17.4 % — HIGH (ref 10.3–14.5)
SODIUM SERPL-SCNC: 164 MMOL/L — CRITICAL HIGH (ref 135–145)
SPECIMEN SOURCE: SIGNIFICANT CHANGE UP
WBC # BLD: 11.5 K/UL — HIGH (ref 3.8–10.5)
WBC # FLD AUTO: 11.5 K/UL — HIGH (ref 3.8–10.5)

## 2018-03-28 PROCEDURE — 99233 SBSQ HOSP IP/OBS HIGH 50: CPT

## 2018-03-28 RX ORDER — MORPHINE SULFATE 50 MG/1
1 CAPSULE, EXTENDED RELEASE ORAL
Qty: 0 | Refills: 0 | Status: DISCONTINUED | OUTPATIENT
Start: 2018-03-28 | End: 2018-03-29

## 2018-03-28 RX ORDER — FERROUS SULFATE 325(65) MG
1 TABLET ORAL
Qty: 0 | Refills: 0 | COMMUNITY

## 2018-03-28 RX ORDER — ALLOPURINOL 300 MG
1 TABLET ORAL
Qty: 0 | Refills: 0 | COMMUNITY

## 2018-03-28 RX ORDER — CEFTRIAXONE 500 MG/1
1 INJECTION, POWDER, FOR SOLUTION INTRAMUSCULAR; INTRAVENOUS
Qty: 0 | Refills: 0 | COMMUNITY
Start: 2018-03-28

## 2018-03-28 RX ORDER — DEXTROSE MONOHYDRATE, SODIUM CHLORIDE, AND POTASSIUM CHLORIDE 50; .745; 4.5 G/1000ML; G/1000ML; G/1000ML
1000 INJECTION, SOLUTION INTRAVENOUS
Qty: 0 | Refills: 0 | Status: DISCONTINUED | OUTPATIENT
Start: 2018-03-28 | End: 2018-03-29

## 2018-03-28 RX ORDER — AMLODIPINE BESYLATE 2.5 MG/1
1 TABLET ORAL
Qty: 0 | Refills: 0 | COMMUNITY

## 2018-03-28 RX ORDER — POTASSIUM CHLORIDE 20 MEQ
10 PACKET (EA) ORAL ONCE
Qty: 0 | Refills: 0 | Status: COMPLETED | OUTPATIENT
Start: 2018-03-28 | End: 2018-03-28

## 2018-03-28 RX ORDER — ACETAMINOPHEN 500 MG
650 TABLET ORAL EVERY 6 HOURS
Qty: 0 | Refills: 0 | Status: DISCONTINUED | OUTPATIENT
Start: 2018-03-28 | End: 2018-03-29

## 2018-03-28 RX ORDER — MORPHINE SULFATE 50 MG/1
1 CAPSULE, EXTENDED RELEASE ORAL
Qty: 0 | Refills: 0 | Status: DISCONTINUED | OUTPATIENT
Start: 2018-03-28 | End: 2018-03-28

## 2018-03-28 RX ORDER — ROBINUL 0.2 MG/ML
0.2 INJECTION INTRAMUSCULAR; INTRAVENOUS EVERY 6 HOURS
Qty: 0 | Refills: 0 | Status: DISCONTINUED | OUTPATIENT
Start: 2018-03-28 | End: 2018-03-29

## 2018-03-28 RX ORDER — MORPHINE SULFATE 50 MG/1
0.5 CAPSULE, EXTENDED RELEASE ORAL EVERY 4 HOURS
Qty: 0 | Refills: 0 | Status: DISCONTINUED | OUTPATIENT
Start: 2018-03-28 | End: 2018-03-28

## 2018-03-28 RX ORDER — POTASSIUM CHLORIDE 20 MEQ
40 PACKET (EA) ORAL ONCE
Qty: 0 | Refills: 0 | Status: DISCONTINUED | OUTPATIENT
Start: 2018-03-28 | End: 2018-03-28

## 2018-03-28 RX ORDER — MORPHINE SULFATE 50 MG/1
0.5 CAPSULE, EXTENDED RELEASE ORAL
Qty: 100 | Refills: 0 | Status: DISCONTINUED | OUTPATIENT
Start: 2018-03-28 | End: 2018-03-29

## 2018-03-28 RX ADMIN — DEXTROSE MONOHYDRATE, SODIUM CHLORIDE, AND POTASSIUM CHLORIDE 75 MILLILITER(S): 50; .745; 4.5 INJECTION, SOLUTION INTRAVENOUS at 10:29

## 2018-03-28 RX ADMIN — HEPARIN SODIUM 5000 UNIT(S): 5000 INJECTION INTRAVENOUS; SUBCUTANEOUS at 12:34

## 2018-03-28 RX ADMIN — MORPHINE SULFATE 1 MILLIGRAM(S): 50 CAPSULE, EXTENDED RELEASE ORAL at 12:45

## 2018-03-28 RX ADMIN — Medication 650 MILLIGRAM(S): at 12:30

## 2018-03-28 RX ADMIN — HEPARIN SODIUM 5000 UNIT(S): 5000 INJECTION INTRAVENOUS; SUBCUTANEOUS at 05:54

## 2018-03-28 RX ADMIN — MORPHINE SULFATE 0.5 MG/HR: 50 CAPSULE, EXTENDED RELEASE ORAL at 17:43

## 2018-03-28 RX ADMIN — Medication 100 MILLIEQUIVALENT(S): at 10:29

## 2018-03-28 RX ADMIN — LIDOCAINE 1 PATCH: 4 CREAM TOPICAL at 00:15

## 2018-03-28 RX ADMIN — ROBINUL 0.2 MILLIGRAM(S): 0.2 INJECTION INTRAMUSCULAR; INTRAVENOUS at 23:43

## 2018-03-28 RX ADMIN — Medication 0.5 MILLIGRAM(S): at 12:32

## 2018-03-28 RX ADMIN — LIDOCAINE 1 PATCH: 4 CREAM TOPICAL at 23:46

## 2018-03-28 RX ADMIN — ROBINUL 0.2 MILLIGRAM(S): 0.2 INJECTION INTRAMUSCULAR; INTRAVENOUS at 05:54

## 2018-03-28 RX ADMIN — LIDOCAINE 1 PATCH: 4 CREAM TOPICAL at 12:34

## 2018-03-28 RX ADMIN — MORPHINE SULFATE 1 MILLIGRAM(S): 50 CAPSULE, EXTENDED RELEASE ORAL at 12:33

## 2018-03-28 RX ADMIN — ROBINUL 0.2 MILLIGRAM(S): 0.2 INJECTION INTRAMUSCULAR; INTRAVENOUS at 12:32

## 2018-03-28 RX ADMIN — MORPHINE SULFATE 0.5 MG/HR: 50 CAPSULE, EXTENDED RELEASE ORAL at 18:00

## 2018-03-28 RX ADMIN — DEXTROSE MONOHYDRATE, SODIUM CHLORIDE, AND POTASSIUM CHLORIDE 75 MILLILITER(S): 50; .745; 4.5 INJECTION, SOLUTION INTRAVENOUS at 22:16

## 2018-03-28 RX ADMIN — ROBINUL 0.2 MILLIGRAM(S): 0.2 INJECTION INTRAMUSCULAR; INTRAVENOUS at 17:43

## 2018-03-28 NOTE — PROGRESS NOTE ADULT - PROBLEM SELECTOR PLAN 4
Met with patient's nieces at bedside; they are agreeable with inpatient hospice for symptom management; awaiting hospice bed; D/W hospice attending Dr. Chi; attending Dr. Ashford aware of the plan

## 2018-03-28 NOTE — DISCHARGE NOTE ADULT - PLAN OF CARE
Comfort care Patient presented with a syncope episode and the work up was unremarkable but revealed likely metastatic cancer which was complicated with urinary tract infection and failure adequate oral intake with subsequent electrolyte abnormalities that have been difficult to correct with intravenous supplementation. The family was made aware of his progress and palliative was consulted - family agrees with comfort care.

## 2018-03-28 NOTE — PROGRESS NOTE ADULT - SUBJECTIVE AND OBJECTIVE BOX
PGY 1 Note discussed with supervising resident and primary attending    Patient is a 87y old  Male who presents with a chief complaint of syncopal episode today, found on floor of apartment (21 Mar 2018 03:03)      INTERVAL HPI/OVERNIGHT EVENTS: Patient seen and examined at bedside with no new complaints 0- 2 episodes of fever, sent UA (+), UCx, and BCx - starting w/ Rocephin and c/w IVFs; daughter made aware of poor prognosis and plan for possible inpatient hospice     MEDICATIONS  (STANDING):  allopurinol 300 milliGRAM(s) Oral daily  amLODIPine   Tablet 5 milliGRAM(s) Oral daily  cefTRIAXone   IVPB 1 Gram(s) IV Intermittent every 24 hours  cefTRIAXone   IVPB      dextrose 5% with potassium chloride 20 mEq/L 1000 milliLiter(s) (75 mL/Hr) IV Continuous <Continuous>  dextrose 50% Injectable 12.5 Gram(s) IV Push once  dextrose 50% Injectable 25 Gram(s) IV Push once  dextrose 50% Injectable 25 Gram(s) IV Push once  docusate sodium 100 milliGRAM(s) Oral two times a day  heparin  Injectable 5000 Unit(s) SubCutaneous every 8 hours  lidocaine   Patch 1 Patch Transdermal daily  melatonin 3 milliGRAM(s) Oral at bedtime  metoprolol tartrate 25 milliGRAM(s) Oral two times a day  senna 2 Tablet(s) Oral at bedtime    MEDICATIONS  (PRN):  acetaminophen  Suppository 650 milliGRAM(s) Rectal every 6 hours PRN For Temp greater than 38 C (100.4 F)  dextrose Gel 1 Dose(s) Oral once PRN Blood Glucose LESS THAN 70 milliGRAM(s)/deciliter  glucagon  Injectable 1 milliGRAM(s) IntraMuscular once PRN Glucose LESS THAN 70 milligrams/deciliter  glycopyrrolate Injectable 0.2 milliGRAM(s) IV Push every 6 hours PRN Secretions  LORazepam   Injectable 0.5 milliGRAM(s) IV Push every 4 hours PRN Agitation  morphine  - Injectable 0.5 milliGRAM(s) IV Push every 1 hour PRN Dyspnea  morphine  - Injectable 2 milliGRAM(s) IV Push every 2 hours PRN Severe Pain (7 - 10)  traMADol 25 milliGRAM(s) Oral every 6 hours PRN Severe Pain (7 - 10)      __________________________________________________  REVIEW OF SYSTEMS: disoriented      Vital Signs Last 24 Hrs  T(C): 37.9 (28 Mar 2018 05:22), Max: 38.8 (27 Mar 2018 18:10)  T(F): 100.3 (28 Mar 2018 05:22), Max: 101.8 (27 Mar 2018 18:10)  HR: 96 (28 Mar 2018 05:22) (72 - 124)  BP: 101/71 (28 Mar 2018 05:22) (94/46 - 126/87)  BP(mean): --  RR: 22 (28 Mar 2018 05:22) (18 - 34)  SpO2: 99% (28 Mar 2018 05:22) (90% - 99%)    ________________________________________________  PHYSICAL EXAM:  GENERAL: NAD  HEENT: Normocephalic;  conjunctivae and sclerae clear; moist mucous membranes;   NECK : supple  CHEST/LUNG: Clear to auscultation bilaterally with good air entry   HEART: S1 S2  regular; no murmurs, gallops or rubs  ABDOMEN: Soft, Nontender, Nondistended; Bowel sounds present  EXTREMITIES: No cyanosis; no edema; no calf tenderness  NERVOUS SYSTEM:  Awake, moves all extremities ; no new deficits    _________________________________________________  LABS:                        8.9    11.5  )-----------( 140      ( 28 Mar 2018 05:40 )             28.2         164<HH>  |  127<H>  |  48<H>  ----------------------------<  118<H>  3.4<L>   |  28  |  1.21    Ca    9.6      28 Mar 2018 05:40  Mg     2.1             Urinalysis Basic - ( 27 Mar 2018 16:59 )    Color: Yellow / Appearance: Slightly Turbid / S.020 / pH: x  Gluc: x / Ketone: Trace  / Bili: Small / Urobili: 4   Blood: x / Protein: 30 mg/dL / Nitrite: Negative   Leuk Esterase: Trace / RBC: 2-5 /HPF / WBC 6-10 /HPF   Sq Epi: x / Non Sq Epi: Moderate /HPF / Bacteria: Moderate /HPF      CAPILLARY BLOOD GLUCOSE      POCT Blood Glucose.: 125 mg/dL (27 Mar 2018 12:50)  POCT Blood Glucose.: 121 mg/dL (27 Mar 2018 08:41)        RADIOLOGY & ADDITIONAL TESTS:    Imaging Personally Reviewed:  YES    Consultant(s) Notes Reviewed:   YES    Care Discussed with Consultants : YES    Plan of care was discussed with patient and /or primary care giver; all questions and concerns were addressed and care was aligned with patient's wishes.

## 2018-03-28 NOTE — PROGRESS NOTE ADULT - SUBJECTIVE AND OBJECTIVE BOX
Patient is a 87y old  Male who presents with a chief complaint of syncopal episode today, found on floor of apartment (28 Mar 2018 14:42)      INTERVAL HPI/OVERNIGHT EVENTS:  Pt examined at bedside, awake,  pt is disoriented, lethargic    VITAL SIGNS:  T(F): 99.4 (18 @ 13:59)  HR: 85 (18 @ 13:59)  BP: 148/40 (18 @ 13:59)  RR: 19 (18 @ 13:59)  SpO2: 95% (18 @ 13:59)  Wt(kg): --  I&O's Detail    27 Mar 2018 07:01  -  28 Mar 2018 07:00  --------------------------------------------------------  IN:  Total IN: 0 mL    OUT:    Voided: 3 mL  Total OUT: 3 mL    Total NET: -3 mL              REVIEW OF SYSTEMS: Unable to obtain      PHYSICAL EXAM:    GENERAL: NAD  HEENT: Normocephalic;  conjunctivae and sclerae clear; moist mucous membranes;   NECK : supple  CHEST/LUNG: good air entry  HEART: S1 S2  regular; no murmurs, gallops or rubs  ABDOMEN: Soft, Nontender, Nondistended; Bowel sounds present  EXTREMITIES: No cyanosis; no edema; no calf tenderness  NERVOUS SYSTEM:  Awake, moves all extremities ; no new deficits    MEDICATIONS  (STANDING):  dextrose 5% with potassium chloride 20 mEq/L 1000 milliLiter(s) (75 mL/Hr) IV Continuous <Continuous>  dextrose 50% Injectable 12.5 Gram(s) IV Push once  dextrose 50% Injectable 25 Gram(s) IV Push once  dextrose 50% Injectable 25 Gram(s) IV Push once  glycopyrrolate Injectable 0.2 milliGRAM(s) IV Push every 6 hours  lidocaine   Patch 1 Patch Transdermal daily  metoprolol tartrate 25 milliGRAM(s) Oral two times a day  morphine  Infusion 0.5 mG/Hr (0.5 mL/Hr) IV Continuous <Continuous>    MEDICATIONS  (PRN):  acetaminophen  Suppository 650 milliGRAM(s) Rectal every 6 hours PRN For Temp greater than 38 C (100.4 F)  dextrose Gel 1 Dose(s) Oral once PRN Blood Glucose LESS THAN 70 milliGRAM(s)/deciliter  glucagon  Injectable 1 milliGRAM(s) IntraMuscular once PRN Glucose LESS THAN 70 milligrams/deciliter  LORazepam   Injectable 0.5 milliGRAM(s) IV Push every 4 hours PRN Agitation  morphine  - Injectable 1 milliGRAM(s) IV Push every 1 hour PRN Dyspnea      Allergies    No Known Allergies    Intolerances        LABS:                        8.9    11.5  )-----------( 140      ( 28 Mar 2018 05:40 )             28.2         164<HH>  |  127<H>  |  48<H>  ----------------------------<  118<H>  3.4<L>   |  28  |  1.21    Ca    9.6      28 Mar 2018 05:40  Mg     2.1             Urinalysis Basic - ( 27 Mar 2018 16:59 )    Color: Yellow / Appearance: Slightly Turbid / S.020 / pH: x  Gluc: x / Ketone: Trace  / Bili: Small / Urobili: 4   Blood: x / Protein: 30 mg/dL / Nitrite: Negative   Leuk Esterase: Trace / RBC: 2-5 /HPF / WBC 6-10 /HPF   Sq Epi: x / Non Sq Epi: Moderate /HPF / Bacteria: Moderate /HPF            CAPILLARY BLOOD GLUCOSE      POCT Blood Glucose.: 147 mg/dL (28 Mar 2018 11:46)  POCT Blood Glucose.: 116 mg/dL (28 Mar 2018 08:36)        RADIOLOGY & ADDITIONAL TESTS:    CXR:    Ct scan chest:    ekg;    echo:

## 2018-03-28 NOTE — PROGRESS NOTE ADULT - PROBLEM SELECTOR PLAN 2
Consulted Pulmonary Dr Storm, Heme Onc Dr Bailey, and Palliative Brooke NP   ***Imaging showed large LLL mass, right clavicle mass, bone lesion, multiple mediastinal and hilar nodes; Likely Stage 4 Lung CA  - D/w HCP Jennifer; decided for hospice, wants [facility] but may qualify for inpatient

## 2018-03-28 NOTE — DISCHARGE NOTE ADULT - MEDICATION SUMMARY - MEDICATIONS TO STOP TAKING
I will STOP taking the medications listed below when I get home from the hospital:    allopurinol 300 mg oral tablet  -- 1 tab(s) by mouth once a day    amLODIPine 5 mg oral tablet  -- 1 tab(s) by mouth once a day    ferrous sulfate 325 mg (65 mg elemental iron) oral delayed release tablet  -- 1 tab(s) by mouth once a day

## 2018-03-28 NOTE — PROGRESS NOTE ADULT - PROVIDER SPECIALTY LIST ADULT
Heme/Onc
Internal Medicine
Palliative Care
Palliative Care
Pulmonology
Internal Medicine

## 2018-03-28 NOTE — DISCHARGE NOTE ADULT - CARE PLAN
Principal Discharge DX:	Failure to thrive in adult  Goal:	Comfort care  Assessment and plan of treatment:	Patient presented with a syncope episode and the work up was unremarkable but revealed likely metastatic cancer which was complicated with urinary tract infection and failure adequate oral intake with subsequent electrolyte abnormalities that have been difficult to correct with intravenous supplementation. The family was made aware of his progress and palliative was consulted - family agrees with comfort care.

## 2018-03-28 NOTE — PROGRESS NOTE ADULT - PROBLEM SELECTOR PROBLEM 1
Lung mass
Failure to thrive in adult
Lung mass
R/O Syncope
Lung mass
Failure to thrive in adult

## 2018-03-28 NOTE — PROGRESS NOTE ADULT - PROBLEM SELECTOR PROBLEM 9
Anemia
Anemia
Need for prophylactic measure

## 2018-03-28 NOTE — PROGRESS NOTE ADULT - PROBLEM SELECTOR PLAN 2
Secondary to likely metastatic lung cancer, started morphine infusion 0.5 mg/hr with PRN, Robinul 0.2 mg IV q6h, Ativan 0.5 mg PRN

## 2018-03-28 NOTE — DISCHARGE NOTE ADULT - PATIENT PORTAL LINK FT
You can access the [email or web address] Patient Portal, offered by [facility], by registering with the following website: [email or web address]

## 2018-03-28 NOTE — PROGRESS NOTE ADULT - ASSESSMENT
86 y/o M pt with PMHx of Anemia (on Iron), HTN, gout and no significant PSHx BIBA to ED with syncopal episode today. EMS reports pt was found on the floor of his apartment. In ED, pt states he slipped and fell on his mattress on the night of 3/19, and was unable to get up until he was assisted by "policemen and a nurse" on 3/20. Pt notes recent dyspnea on exertion, and generalized pain everywhere after the fall. Pt denies LOC, head trauma.     Pt admitted for evaluation of possible syncope given unwitnessed fall along with rhabdomyolysis.
86 y/o M pt with PMHx of Anemia (on Iron), HTN, gout and no significant PSHx BIBA to ED with syncopal episode today. EMS reports pt was found on the floor of his apartment. In ED, pt states he slipped and fell on his mattress on the night of 3/19, and was unable to get up until he was assisted by "policemen and a nurse" on 3/20. Pt notes recent dyspnea on exertion, and generalized pain everywhere after the fall. Pt denies LOC, head trauma.     Pt admitted for evaluation of possible syncope given unwitnessed fall along with rhabdomyolysis.
87 M PMH Anemia (on Iron), HTN, Gout and no significant PSHx BIBA to ED with syncopal episode  w/ LOC s/p slip and fall - later found to have concerns for Lung malignancy - niece agreeable for palliative w/u fever likely 2/2 to UTI
87 M PMH Anemia (on Iron), HTN, Gout and no significant PSHx BIBA to ED with syncopal episode  w/ LOC s/p slip and fall - later found to have concerns for Lung malignancy - pending niece decision on palliative versus w/u
88 y/o M pt with PMHx of Anemia (on Iron), HTN, gout and no significant PSHx BIBA to ED with syncopal episode today. EMS reports pt was found on the floor of his apartment. In ED, pt states he slipped and fell on his mattress on the night of 3/19, and was unable to get up until he was assisted by "policemen and a nurse" on 3/20. Pt notes recent dyspnea on exertion, and generalized pain everywhere after the fall. Pt denies LOC, head trauma.     Pt admitted for evaluation of possible syncope given unwitnessed fall along with rhabdomyolysis.
87 M PMH Anemia (on Iron), HTN, Gout and no significant PSHx BIBA to ED with syncopal episode  w/ LOC s/p slip and fall - later found to have concerns for Lung malignancy - pending niece decision on palliative versus w/u

## 2018-03-28 NOTE — PROGRESS NOTE ADULT - PROBLEM SELECTOR PLAN 1
Will need Bronch with biopsy by Dr montano if patient and family agree  Bronchodilators neb  oxygen supp
Family refuses further w/u   Bronchodilators neb  oxygen supp
Family refusing further workup, now deteriorating, family agreeable with inpatient hospice for symptom management.
Will need Bronch with biopsy by Dr montano once patient and family agree  Bronchodilators neb  oxygen supp
Will need Bronch with biopsy by Dr montano once patient and family agree  Bronchodilators neb  oxygen supp
Consulted Pulmonary Dr Storm, Heme Onc Dr Bailey, and Palliative Brooke NP   ***Imaging showed large LLL mass, right clavicle mass, bone lesion, multiple mediastinal and hilar nodes; Likely Stage 4 Lung CA  - D/w HCP Jennifer - decision pending; Leo recommends comfort care and Emeterio recommends bronchoscopy
Likely 2/2 to infection w/ 2 episodes of F, 101.7  - CXR unchanged  - UA positive w/ pyruia  ***Pending UCx and BCx  - C/w Rocephin Day 2  - Adjusted fluids for elevated Na and low K
Pulmonary Dr Storm consulted  Heme Onc Dr Bailey  Palliative Brooke NP consulted  rec bronch next week w/ Dr Liz if family agrees  large LLL mass, right clavicle mass, bone lesion, multiple mediastinal and hilar nodes  lung ca, stage 4  from Onc perspective - rec hospice  d/w HCP Jennifer - decision pending
Pulmonary Dr Storm consulted  Palliative Brooke LITTLE consulted  rec bronch next week w/ Dr Liz if family agrees  d/w HCP Jennifer - decision pending
continue with telemetry monitoring for 24 hrs  ECHO EF 55%, mild pHTN  carotid doppler wnl  tropx2 neg
Family refusing further workup, now deteriorating, family agreeable with inpatient hospice for symptom management
Worsening mental status w/out signs or symptoms of infection  ***Pending CXR  - Adjusted fluids for elevated Na and low K

## 2018-03-28 NOTE — PROGRESS NOTE ADULT - PROBLEM SELECTOR PROBLEM 2
Lung mass
Bone metastasis
Dyspnea
Leukocytosis
Lung mass
R/O Syncope
Dyspnea

## 2018-03-28 NOTE — DISCHARGE NOTE ADULT - HOSPITAL COURSE
87 M PMH Anemia (on Iron), HTN, Gout and no significant PSHx BIBA to ED with syncopal episode w/ LOC s/p slip and fall - later found to have concerns for Lung malignancy - niece agreeable for palliative w/u fever likely 2/2 to UTI. Initial work up was concerning for lung mass w/ right clavicle mass, bone lesion, multiple mediastinal and hilar nodes; Likely Stage 4 Lung CA - we consulted Pulmonary Dr Storm, Heme Onc Dr Bailey, and Palliative Brooke NP family ultimately agreed w/ inpatient hospice s/p clinical worsening of patient's condition 2/2 FTT complicated w/ urinary tract infection and poor PO intake resulting in hypernatremia and hypokalemia resistant to IVF management. The initial TTE EF 55%, mild pHTN, carotid doppler wnl, CE negative x 2, no acute events on telemetry. Notable rib fracture and treated w/ Morphine PRN. Otherwise other medical conditions were stable. Patient seen and examined at bedside with no acute complaints. The medical plan and results were discussed with the patient throughout the hospital course. Patient is medically clear for discharge for inpatient hospice. Please see above and the medical records for additional information.

## 2018-03-28 NOTE — PROGRESS NOTE ADULT - SUBJECTIVE AND OBJECTIVE BOX
OVERNIGHT EVENTS: Increasing Agitation, dyspnea; nieces agreeable to inpatient hospice, awaiting bed    Review of Systems: Unable to obtain due to poor mentation    MEDICATIONS  (STANDING):  dextrose 5% with potassium chloride 20 mEq/L 1000 milliLiter(s) (75 mL/Hr) IV Continuous <Continuous>  dextrose 50% Injectable 12.5 Gram(s) IV Push once  dextrose 50% Injectable 25 Gram(s) IV Push once  dextrose 50% Injectable 25 Gram(s) IV Push once  glycopyrrolate Injectable 0.2 milliGRAM(s) IV Push every 6 hours  lidocaine   Patch 1 Patch Transdermal daily  metoprolol tartrate 25 milliGRAM(s) Oral two times a day  morphine  Infusion 0.5 mG/Hr (0.5 mL/Hr) IV Continuous <Continuous>    MEDICATIONS  (PRN):  acetaminophen  Suppository 650 milliGRAM(s) Rectal every 6 hours PRN For Temp greater than 38 C (100.4 F)  dextrose Gel 1 Dose(s) Oral once PRN Blood Glucose LESS THAN 70 milliGRAM(s)/deciliter  glucagon  Injectable 1 milliGRAM(s) IntraMuscular once PRN Glucose LESS THAN 70 milligrams/deciliter  LORazepam   Injectable 0.5 milliGRAM(s) IV Push every 4 hours PRN Agitation  morphine  - Injectable 1 milliGRAM(s) IV Push every 1 hour PRN Dyspnea    PHYSICAL EXAM:  Vital Signs Last 24 Hrs  T(C): 37.4 (28 Mar 2018 13:59), Max: 38.8 (27 Mar 2018 18:10)  T(F): 99.4 (28 Mar 2018 13:59), Max: 101.8 (27 Mar 2018 18:10)  HR: 85 (28 Mar 2018 13:59) (72 - 124)  BP: 148/40 (28 Mar 2018 13:59) (94/46 - 148/40)  RR: 19 (28 Mar 2018 13:59) (19 - 34)  SpO2: 95% (28 Mar 2018 13:59) (94% - 99%)    General: alert  oriented x __0__    lethargic, distressed, nonverbal    Karnofsky Performance Score/Palliative Performance Status Version2: 20%    HEENT: Dry mouth  Lungs: Tachypnea/labored breathing,  excessive secretions  CV: Normal    GI: Incontinent  : Incontinent   Musculoskeletal: Weakness, bedbound/wheelchair bound  Skin: normal    Neuro: Cognitive impairment   Oral intake ability: unable/only mouth care   Diet: Pureed    LABS:                          8.9    11.5  )-----------( 140      ( 28 Mar 2018 05:40 )             28.2         164<HH>  |  127<H>  |  48<H>  ----------------------------<  118<H>  3.4<L>   |  28  |  1.21    Ca    9.6      28 Mar 2018 05:40  Mg     2.1         Urinalysis Basic - ( 27 Mar 2018 16:59 )    Color: Yellow / Appearance: Slightly Turbid / S.020 / pH: x  Gluc: x / Ketone: Trace  / Bili: Small / Urobili: 4   Blood: x / Protein: 30 mg/dL / Nitrite: Negative   Leuk Esterase: Trace / RBC: 2-5 /HPF / WBC 6-10 /HPF   Sq Epi: x / Non Sq Epi: Moderate /HPF / Bacteria: Moderate /HPF        RADIOLOGY & ADDITIONAL STUDIES:    ADVANCE DIRECTIVES: DNR/DNI

## 2018-03-28 NOTE — DISCHARGE NOTE ADULT - MEDICATION SUMMARY - MEDICATIONS TO TAKE
I will START or STAY ON the medications listed below when I get home from the hospital:    cefTRIAXone  -- 1 gram(s) intravenous once a day until 03/31/18  -- Indication: For UTI

## 2018-03-28 NOTE — PROGRESS NOTE ADULT - PROBLEM SELECTOR PLAN 9
H&H stable; C/w Fe supplementation
H&H stable; C/w Fe supplementation
IMPROVE VTE Individual Risk Assessment          RISK                                                          Points   [ x ] Immobilization > 24 hrs                              1  [ x ] Age > 60                                                         1    IMPROVE VTE Score: 2    c/w lovenox for VTE prophylaxis  GI ppx not indicated
IMPROVE VTE Individual Risk Assessment          RISK                                                          Points   [ x ] Immobilization > 24 hrs                              1  [ x ] Age > 60                                                         1    IMPROVE VTE Score: 2    c/w lovenox for VTE prophylaxis  GI ppx not indicated
IMPROVE VTE Individual Risk Assessment        RISK                                                          Points   [ x ] Immobilization > 24 hrs                              1  [ x ] Age > 60                                                         1  IMPROVE VTE Score: 2, C/w Lovenox for VTE prophylaxis  - GI PPx not indicated
IMPROVE VTE Individual Risk Assessment          RISK                                                          Points   [ x ] Immobilization > 24 hrs                              1  [ x ] Age > 60                                                         1    IMPROVE VTE Score: 2    c/w lovenox for VTE prophylaxis  GI ppx not indicated

## 2018-03-29 ENCOUNTER — INPATIENT (INPATIENT)
Facility: HOSPITAL | Age: 83
LOS: 0 days | DRG: 951 | End: 2018-03-30
Attending: INTERNAL MEDICINE | Admitting: INTERNAL MEDICINE
Payer: OTHER MISCELLANEOUS

## 2018-03-29 VITALS
HEART RATE: 112 BPM | RESPIRATION RATE: 22 BRPM | DIASTOLIC BLOOD PRESSURE: 67 MMHG | SYSTOLIC BLOOD PRESSURE: 137 MMHG | OXYGEN SATURATION: 96 % | TEMPERATURE: 101 F

## 2018-03-29 VITALS
DIASTOLIC BLOOD PRESSURE: 64 MMHG | HEART RATE: 97 BPM | RESPIRATION RATE: 18 BRPM | OXYGEN SATURATION: 93 % | HEIGHT: 70 IN | SYSTOLIC BLOOD PRESSURE: 148 MMHG | TEMPERATURE: 99 F | WEIGHT: 182.54 LBS

## 2018-03-29 DIAGNOSIS — C34.90 MALIGNANT NEOPLASM OF UNSPECIFIED PART OF UNSPECIFIED BRONCHUS OR LUNG: ICD-10-CM

## 2018-03-29 PROCEDURE — 83615 LACTATE (LD) (LDH) ENZYME: CPT

## 2018-03-29 PROCEDURE — 82607 VITAMIN B-12: CPT

## 2018-03-29 PROCEDURE — 83036 HEMOGLOBIN GLYCOSYLATED A1C: CPT

## 2018-03-29 PROCEDURE — 82746 ASSAY OF FOLIC ACID SERUM: CPT

## 2018-03-29 PROCEDURE — 93880 EXTRACRANIAL BILAT STUDY: CPT

## 2018-03-29 PROCEDURE — 71260 CT THORAX DX C+: CPT

## 2018-03-29 PROCEDURE — 84145 PROCALCITONIN (PCT): CPT

## 2018-03-29 PROCEDURE — 96375 TX/PRO/DX INJ NEW DRUG ADDON: CPT

## 2018-03-29 PROCEDURE — 93005 ELECTROCARDIOGRAM TRACING: CPT

## 2018-03-29 PROCEDURE — 82378 CARCINOEMBRYONIC ANTIGEN: CPT

## 2018-03-29 PROCEDURE — 83735 ASSAY OF MAGNESIUM: CPT

## 2018-03-29 PROCEDURE — 84484 ASSAY OF TROPONIN QUANT: CPT

## 2018-03-29 PROCEDURE — 82803 BLOOD GASES ANY COMBINATION: CPT

## 2018-03-29 PROCEDURE — 83010 ASSAY OF HAPTOGLOBIN QUANT: CPT

## 2018-03-29 PROCEDURE — 83519 RIA NONANTIBODY: CPT

## 2018-03-29 PROCEDURE — 81001 URINALYSIS AUTO W/SCOPE: CPT

## 2018-03-29 PROCEDURE — 99285 EMERGENCY DEPT VISIT HI MDM: CPT | Mod: 25

## 2018-03-29 PROCEDURE — 87486 CHLMYD PNEUM DNA AMP PROBE: CPT

## 2018-03-29 PROCEDURE — 96374 THER/PROPH/DIAG INJ IV PUSH: CPT

## 2018-03-29 PROCEDURE — 70450 CT HEAD/BRAIN W/O DYE: CPT

## 2018-03-29 PROCEDURE — 82962 GLUCOSE BLOOD TEST: CPT

## 2018-03-29 PROCEDURE — 82306 VITAMIN D 25 HYDROXY: CPT

## 2018-03-29 PROCEDURE — 93306 TTE W/DOPPLER COMPLETE: CPT

## 2018-03-29 PROCEDURE — 74177 CT ABD & PELVIS W/CONTRAST: CPT

## 2018-03-29 PROCEDURE — 72170 X-RAY EXAM OF PELVIS: CPT

## 2018-03-29 PROCEDURE — 76700 US EXAM ABDOM COMPLETE: CPT

## 2018-03-29 PROCEDURE — 87581 M.PNEUMON DNA AMP PROBE: CPT

## 2018-03-29 PROCEDURE — 83970 ASSAY OF PARATHORMONE: CPT

## 2018-03-29 PROCEDURE — 87086 URINE CULTURE/COLONY COUNT: CPT

## 2018-03-29 PROCEDURE — 86334 IMMUNOFIX E-PHORESIS SERUM: CPT

## 2018-03-29 PROCEDURE — 85652 RBC SED RATE AUTOMATED: CPT

## 2018-03-29 PROCEDURE — 82553 CREATINE MB FRACTION: CPT

## 2018-03-29 PROCEDURE — 84100 ASSAY OF PHOSPHORUS: CPT

## 2018-03-29 PROCEDURE — 85045 AUTOMATED RETICULOCYTE COUNT: CPT

## 2018-03-29 PROCEDURE — 80053 COMPREHEN METABOLIC PANEL: CPT

## 2018-03-29 PROCEDURE — 83880 ASSAY OF NATRIURETIC PEPTIDE: CPT

## 2018-03-29 PROCEDURE — 82728 ASSAY OF FERRITIN: CPT

## 2018-03-29 PROCEDURE — 80048 BASIC METABOLIC PNL TOTAL CA: CPT

## 2018-03-29 PROCEDURE — 83521 IG LIGHT CHAINS FREE EACH: CPT

## 2018-03-29 PROCEDURE — 85027 COMPLETE CBC AUTOMATED: CPT

## 2018-03-29 PROCEDURE — 87798 DETECT AGENT NOS DNA AMP: CPT

## 2018-03-29 PROCEDURE — 82550 ASSAY OF CK (CPK): CPT

## 2018-03-29 PROCEDURE — 83605 ASSAY OF LACTIC ACID: CPT

## 2018-03-29 PROCEDURE — 71045 X-RAY EXAM CHEST 1 VIEW: CPT

## 2018-03-29 PROCEDURE — 87633 RESP VIRUS 12-25 TARGETS: CPT

## 2018-03-29 PROCEDURE — 87040 BLOOD CULTURE FOR BACTERIA: CPT

## 2018-03-29 PROCEDURE — 71046 X-RAY EXAM CHEST 2 VIEWS: CPT

## 2018-03-29 PROCEDURE — 82310 ASSAY OF CALCIUM: CPT

## 2018-03-29 RX ORDER — ACETAMINOPHEN 500 MG
650 TABLET ORAL EVERY 6 HOURS
Qty: 0 | Refills: 0 | Status: DISCONTINUED | OUTPATIENT
Start: 2018-03-29 | End: 2018-03-30

## 2018-03-29 RX ORDER — MORPHINE SULFATE 50 MG/1
0.5 CAPSULE, EXTENDED RELEASE ORAL
Qty: 100 | Refills: 0 | Status: DISCONTINUED | OUTPATIENT
Start: 2018-03-29 | End: 2018-03-30

## 2018-03-29 RX ORDER — MORPHINE SULFATE 50 MG/1
1 CAPSULE, EXTENDED RELEASE ORAL
Qty: 0 | Refills: 0 | Status: DISCONTINUED | OUTPATIENT
Start: 2018-03-29 | End: 2018-03-30

## 2018-03-29 RX ORDER — ROBINUL 0.2 MG/ML
0.2 INJECTION INTRAMUSCULAR; INTRAVENOUS EVERY 4 HOURS
Qty: 0 | Refills: 0 | Status: DISCONTINUED | OUTPATIENT
Start: 2018-03-29 | End: 2018-03-30

## 2018-03-29 RX ADMIN — MORPHINE SULFATE 1 MILLIGRAM(S): 50 CAPSULE, EXTENDED RELEASE ORAL at 11:11

## 2018-03-29 RX ADMIN — ROBINUL 0.2 MILLIGRAM(S): 0.2 INJECTION INTRAMUSCULAR; INTRAVENOUS at 22:38

## 2018-03-29 RX ADMIN — Medication 650 MILLIGRAM(S): at 19:10

## 2018-03-29 RX ADMIN — Medication 1 MILLIGRAM(S): at 05:26

## 2018-03-29 RX ADMIN — MORPHINE SULFATE 1 MILLIGRAM(S): 50 CAPSULE, EXTENDED RELEASE ORAL at 17:06

## 2018-03-29 RX ADMIN — MORPHINE SULFATE 1 MILLIGRAM(S): 50 CAPSULE, EXTENDED RELEASE ORAL at 11:30

## 2018-03-29 RX ADMIN — MORPHINE SULFATE 1 MILLIGRAM(S): 50 CAPSULE, EXTENDED RELEASE ORAL at 02:40

## 2018-03-29 RX ADMIN — MORPHINE SULFATE 1 MILLIGRAM(S): 50 CAPSULE, EXTENDED RELEASE ORAL at 13:23

## 2018-03-29 RX ADMIN — Medication 650 MILLIGRAM(S): at 03:39

## 2018-03-29 RX ADMIN — MORPHINE SULFATE 1 MILLIGRAM(S): 50 CAPSULE, EXTENDED RELEASE ORAL at 17:25

## 2018-03-29 RX ADMIN — ROBINUL 0.2 MILLIGRAM(S): 0.2 INJECTION INTRAMUSCULAR; INTRAVENOUS at 11:11

## 2018-03-29 RX ADMIN — ROBINUL 0.2 MILLIGRAM(S): 0.2 INJECTION INTRAMUSCULAR; INTRAVENOUS at 17:05

## 2018-03-29 RX ADMIN — ROBINUL 0.2 MILLIGRAM(S): 0.2 INJECTION INTRAMUSCULAR; INTRAVENOUS at 05:26

## 2018-03-29 RX ADMIN — MORPHINE SULFATE 1 MILLIGRAM(S): 50 CAPSULE, EXTENDED RELEASE ORAL at 03:05

## 2018-03-29 RX ADMIN — MORPHINE SULFATE 1 MILLIGRAM(S): 50 CAPSULE, EXTENDED RELEASE ORAL at 20:53

## 2018-03-29 RX ADMIN — MORPHINE SULFATE 1 MILLIGRAM(S): 50 CAPSULE, EXTENDED RELEASE ORAL at 13:09

## 2018-03-29 RX ADMIN — MORPHINE SULFATE 1 MILLIGRAM(S): 50 CAPSULE, EXTENDED RELEASE ORAL at 21:10

## 2018-03-29 RX ADMIN — ROBINUL 0.2 MILLIGRAM(S): 0.2 INJECTION INTRAMUSCULAR; INTRAVENOUS at 17:07

## 2018-03-29 NOTE — H&P ADULT - NSHPPHYSICALEXAM_GEN_ALL_CORE
thin male in no respiratory distress  88/28 107 101.2 20 93%  HEENT: moderate bitemporal wasting  Neck: no JVD no nodes no thyromegaly  Lungs: decreased breath sounds and rales at bases  Heart: s1s2 no audible murmurs  Abd: soft mild abdominal distention, NT no masses no organomegaly  Ext: no edema no redness

## 2018-03-29 NOTE — H&P ADULT - ASSESSMENT
87 year old male with hospice diagnosis of metastatic lung cancer admitted to the IPU at [facility] for IV management of dyspnea.

## 2018-03-29 NOTE — H&P ADULT - HISTORY OF PRESENT ILLNESS
87 year old male with hospice diagnosis of newly diagnosed metastatic lung cancer with past medical history of HTN, gout, with recently noted questionable lung malignancy with multiple mediastinal and hilar nodes presumed to be metastatic lung cancer.  The patient was noted to have increased dyspnea.,  The surrogate elected for hospice and the patient was admitted to the IPU at [facility] for IV management of dyspnea and secretions.

## 2018-03-30 VITALS
OXYGEN SATURATION: 83 % | SYSTOLIC BLOOD PRESSURE: 64 MMHG | HEART RATE: 93 BPM | TEMPERATURE: 102 F | DIASTOLIC BLOOD PRESSURE: 15 MMHG | RESPIRATION RATE: 14 BRPM

## 2018-03-30 NOTE — DISCHARGE NOTE FOR THE EXPIRED PATIENT - HOSPITAL COURSE
87 M PMH Anemia (on Iron), HTN, Gout and no significant PSHx BIBA to ED with syncopal episode w/ LOC s/p slip and fall - later found to have concerns for Lung malignancy - niece agreeable for palliative w/u fever likely 2/2 to UTI. Initial work up was concerning for lung mass w/ right clavicle mass, bone lesion, multiple mediastinal and hilar nodes; Likely Stage 4 Lung CA - we consulted Pulmonary Dr Storm, Heme Onc Dr Bailey, and Palliative Brooke NP family ultimately agreed w/ inpatient hospice s/p clinical worsening of patient's condition 2/2 FTT complicated w/ urinary tract infection and poor PO intake resulting in hypernatremia and hypokalemia resistant to IVF management. The initial TTE EF 55%, mild pHTN, carotid doppler wnl, CE negative x 2, no acute events on telemetry. Notable rib fracture and treated w/ Morphine PRN. Otherwise other medical conditions were stable. Patient seen and examined at bedside with no acute complaints. The medical plan and results were discussed with the patient throughout the hospital course. Patient is medically clear for discharge for inpatient hospice.    patient was in patient hospice care. Patient was found unresponsive, On Exam patient did not respond to verbal or physical stimuli,  heart and breath sounds were absent,  no peripheral pulses appreciated. pupils fixed not responding to light. Patient was declared dead on 3/30/2018 at 1:20 AM. Family Ms. Rodriguez ( [phone]) was informed. Family declined autopsy. Organ donation team notified.    Attending physician notified.

## 2018-03-30 NOTE — DISCHARGE NOTE FOR THE EXPIRED PATIENT - OTHER SIGNIFICANT FINDINGS
Organ Donation notified of patient's expiration. Due to patient's age he is not a suitable candidate for donation.

## 2018-04-02 DIAGNOSIS — R52 PAIN, UNSPECIFIED: ICD-10-CM

## 2018-04-02 DIAGNOSIS — K11.7 DISTURBANCES OF SALIVARY SECRETION: ICD-10-CM

## 2018-04-02 DIAGNOSIS — L98.9 DISORDER OF THE SKIN AND SUBCUTANEOUS TISSUE, UNSPECIFIED: ICD-10-CM

## 2018-04-02 DIAGNOSIS — R06.00 DYSPNEA, UNSPECIFIED: ICD-10-CM

## 2018-04-02 DIAGNOSIS — C34.90 MALIGNANT NEOPLASM OF UNSPECIFIED PART OF UNSPECIFIED BRONCHUS OR LUNG: ICD-10-CM

## 2018-04-02 DIAGNOSIS — K59.03 DRUG INDUCED CONSTIPATION: ICD-10-CM

## 2018-04-02 DIAGNOSIS — R41.0 DISORIENTATION, UNSPECIFIED: ICD-10-CM

## 2018-04-02 LAB
CULTURE RESULTS: SIGNIFICANT CHANGE UP
SPECIMEN SOURCE: SIGNIFICANT CHANGE UP

## 2019-01-28 NOTE — DISCHARGE NOTE ADULT - ABILITY TO HEAR (WITH HEARING AID OR HEARING APPLIANCE IF NORMALLY USED):
Mildly to Moderately Impaired: difficulty hearing in some environments or speaker may need to increase volume or speak distinctly
For information on Fall & Injury Prevention, visit [email or web address]

## 2020-03-04 NOTE — PATIENT PROFILE ADULT. - NS PRO MODE OF ARRIVAL
stretcher [place]ycl[place] Counseling:  I discussed with the patient the risks of imiquimod including but not limited to erythema, scaling, itching, weeping, crusting, and pain.  Patient understands that the inflammatory response to imiquimod is variable from person to person and was educated regarded proper titration schedule.  If flu-like symptoms develop, patient knows to discontinue the medication and contact us.

## 2023-02-20 NOTE — DIETITIAN INITIAL EVALUATION ADULT. - MD RECOMMEND
Detail Level: Detailed change diet to dysphagia puree honey thicken liquids diet change diet to dysphagia puree honey thicken liquids diet; Add Ensure Pudding 120ml x tid (510kcal 12g protein) if medically feasible; Swallow evaluation as medically feasible
